# Patient Record
Sex: MALE | Race: WHITE | NOT HISPANIC OR LATINO | URBAN - METROPOLITAN AREA
[De-identification: names, ages, dates, MRNs, and addresses within clinical notes are randomized per-mention and may not be internally consistent; named-entity substitution may affect disease eponyms.]

---

## 2018-04-22 ENCOUNTER — INPATIENT (INPATIENT)
Facility: HOSPITAL | Age: 58
LOS: 1 days | Discharge: HOME | End: 2018-04-24
Attending: FAMILY MEDICINE | Admitting: FAMILY MEDICINE
Payer: COMMERCIAL

## 2018-04-22 VITALS — HEIGHT: 70 IN | WEIGHT: 182.98 LBS

## 2018-04-22 DIAGNOSIS — I82.412 ACUTE EMBOLISM AND THROMBOSIS OF LEFT FEMORAL VEIN: ICD-10-CM

## 2018-04-22 DIAGNOSIS — F17.200 NICOTINE DEPENDENCE, UNSPECIFIED, UNCOMPLICATED: ICD-10-CM

## 2018-04-22 DIAGNOSIS — D58.2 OTHER HEMOGLOBINOPATHIES: ICD-10-CM

## 2018-04-22 DIAGNOSIS — Z90.49 ACQUIRED ABSENCE OF OTHER SPECIFIED PARTS OF DIGESTIVE TRACT: Chronic | ICD-10-CM

## 2018-04-22 DIAGNOSIS — I26.99 OTHER PULMONARY EMBOLISM WITHOUT ACUTE COR PULMONALE: ICD-10-CM

## 2018-04-22 DIAGNOSIS — D72.829 ELEVATED WHITE BLOOD CELL COUNT, UNSPECIFIED: ICD-10-CM

## 2018-04-22 LAB
ADD ON TEST-SPECIMEN IN LAB: SIGNIFICANT CHANGE UP
ALBUMIN SERPL ELPH-MCNC: 3.1 G/DL — LOW (ref 3.3–5)
ALP SERPL-CCNC: 89 U/L — SIGNIFICANT CHANGE UP (ref 40–120)
ALT FLD-CCNC: 17 U/L — SIGNIFICANT CHANGE UP (ref 12–78)
ANION GAP SERPL CALC-SCNC: 8 MMOL/L — SIGNIFICANT CHANGE UP (ref 5–17)
APTT BLD: 25.8 SEC — LOW (ref 27.5–37.4)
APTT BLD: 79.7 SEC — HIGH (ref 27.5–37.4)
AST SERPL-CCNC: 13 U/L — LOW (ref 15–37)
BASOPHILS # BLD AUTO: 0.06 K/UL — SIGNIFICANT CHANGE UP (ref 0–0.2)
BASOPHILS NFR BLD AUTO: 0.5 % — SIGNIFICANT CHANGE UP (ref 0–2)
BILIRUB SERPL-MCNC: 0.5 MG/DL — SIGNIFICANT CHANGE UP (ref 0.2–1.2)
BUN SERPL-MCNC: 14 MG/DL — SIGNIFICANT CHANGE UP (ref 7–23)
CALCIUM SERPL-MCNC: 9.2 MG/DL — SIGNIFICANT CHANGE UP (ref 8.5–10.1)
CHLORIDE SERPL-SCNC: 110 MMOL/L — HIGH (ref 96–108)
CK SERPL-CCNC: 58 U/L — SIGNIFICANT CHANGE UP (ref 26–308)
CO2 SERPL-SCNC: 23 MMOL/L — SIGNIFICANT CHANGE UP (ref 22–31)
CREAT SERPL-MCNC: 0.97 MG/DL — SIGNIFICANT CHANGE UP (ref 0.5–1.3)
EOSINOPHIL # BLD AUTO: 0.2 K/UL — SIGNIFICANT CHANGE UP (ref 0–0.5)
EOSINOPHIL NFR BLD AUTO: 1.7 % — SIGNIFICANT CHANGE UP (ref 0–6)
GLUCOSE SERPL-MCNC: 101 MG/DL — HIGH (ref 70–99)
HCT VFR BLD CALC: 50.9 % — HIGH (ref 39–50)
HGB BLD-MCNC: 17.2 G/DL — HIGH (ref 13–17)
IMM GRANULOCYTES NFR BLD AUTO: 0.3 % — SIGNIFICANT CHANGE UP (ref 0–1.5)
INR BLD: 1.07 RATIO — SIGNIFICANT CHANGE UP (ref 0.88–1.16)
LYMPHOCYTES # BLD AUTO: 1.58 K/UL — SIGNIFICANT CHANGE UP (ref 1–3.3)
LYMPHOCYTES # BLD AUTO: 13.3 % — SIGNIFICANT CHANGE UP (ref 13–44)
MCHC RBC-ENTMCNC: 32 PG — SIGNIFICANT CHANGE UP (ref 27–34)
MCHC RBC-ENTMCNC: 33.8 GM/DL — SIGNIFICANT CHANGE UP (ref 32–36)
MCV RBC AUTO: 94.6 FL — SIGNIFICANT CHANGE UP (ref 80–100)
MONOCYTES # BLD AUTO: 1.09 K/UL — HIGH (ref 0–0.9)
MONOCYTES NFR BLD AUTO: 9.1 % — SIGNIFICANT CHANGE UP (ref 2–14)
NEUTROPHILS # BLD AUTO: 8.95 K/UL — HIGH (ref 1.8–7.4)
NEUTROPHILS NFR BLD AUTO: 75.1 % — SIGNIFICANT CHANGE UP (ref 43–77)
NRBC # BLD: 0 /100 WBCS — SIGNIFICANT CHANGE UP (ref 0–0)
NT-PROBNP SERPL-SCNC: 25 PG/ML — SIGNIFICANT CHANGE UP (ref 0–125)
PLATELET # BLD AUTO: 202 K/UL — SIGNIFICANT CHANGE UP (ref 150–400)
POTASSIUM SERPL-MCNC: 4.2 MMOL/L — SIGNIFICANT CHANGE UP (ref 3.5–5.3)
POTASSIUM SERPL-SCNC: 4.2 MMOL/L — SIGNIFICANT CHANGE UP (ref 3.5–5.3)
PROT SERPL-MCNC: 7.4 GM/DL — SIGNIFICANT CHANGE UP (ref 6–8.3)
PROTHROM AB SERPL-ACNC: 11.6 SEC — SIGNIFICANT CHANGE UP (ref 9.8–12.7)
RBC # BLD: 5.38 M/UL — SIGNIFICANT CHANGE UP (ref 4.2–5.8)
RBC # FLD: 13.5 % — SIGNIFICANT CHANGE UP (ref 10.3–14.5)
SODIUM SERPL-SCNC: 141 MMOL/L — SIGNIFICANT CHANGE UP (ref 135–145)
TROPONIN I SERPL-MCNC: <0.015 NG/ML — SIGNIFICANT CHANGE UP (ref 0.01–0.04)
WBC # BLD: 11.92 K/UL — HIGH (ref 3.8–10.5)
WBC # FLD AUTO: 11.92 K/UL — HIGH (ref 3.8–10.5)

## 2018-04-22 PROCEDURE — 99285 EMERGENCY DEPT VISIT HI MDM: CPT

## 2018-04-22 PROCEDURE — 71275 CT ANGIOGRAPHY CHEST: CPT | Mod: 26

## 2018-04-22 PROCEDURE — 93010 ELECTROCARDIOGRAM REPORT: CPT

## 2018-04-22 RX ORDER — ENOXAPARIN SODIUM 100 MG/ML
80 INJECTION SUBCUTANEOUS ONCE
Qty: 0 | Refills: 0 | Status: DISCONTINUED | OUTPATIENT
Start: 2018-04-22 | End: 2018-04-22

## 2018-04-22 RX ORDER — SODIUM CHLORIDE 9 MG/ML
1000 INJECTION INTRAMUSCULAR; INTRAVENOUS; SUBCUTANEOUS ONCE
Qty: 0 | Refills: 0 | Status: COMPLETED | OUTPATIENT
Start: 2018-04-22 | End: 2018-04-22

## 2018-04-22 RX ORDER — HEPARIN SODIUM 5000 [USP'U]/ML
3000 INJECTION INTRAVENOUS; SUBCUTANEOUS EVERY 6 HOURS
Qty: 0 | Refills: 0 | Status: DISCONTINUED | OUTPATIENT
Start: 2018-04-22 | End: 2018-04-24

## 2018-04-22 RX ORDER — HEPARIN SODIUM 5000 [USP'U]/ML
6500 INJECTION INTRAVENOUS; SUBCUTANEOUS EVERY 6 HOURS
Qty: 0 | Refills: 0 | Status: DISCONTINUED | OUTPATIENT
Start: 2018-04-22 | End: 2018-04-24

## 2018-04-22 RX ORDER — HEPARIN SODIUM 5000 [USP'U]/ML
6500 INJECTION INTRAVENOUS; SUBCUTANEOUS ONCE
Qty: 0 | Refills: 0 | Status: COMPLETED | OUTPATIENT
Start: 2018-04-22 | End: 2018-04-22

## 2018-04-22 RX ORDER — RIVAROXABAN 15 MG-20MG
15 KIT ORAL ONCE
Qty: 0 | Refills: 0 | Status: DISCONTINUED | OUTPATIENT
Start: 2018-04-22 | End: 2018-04-22

## 2018-04-22 RX ORDER — ACETAMINOPHEN 500 MG
650 TABLET ORAL EVERY 6 HOURS
Qty: 0 | Refills: 0 | Status: DISCONTINUED | OUTPATIENT
Start: 2018-04-22 | End: 2018-04-24

## 2018-04-22 RX ORDER — HEPARIN SODIUM 5000 [USP'U]/ML
INJECTION INTRAVENOUS; SUBCUTANEOUS
Qty: 25000 | Refills: 0 | Status: DISCONTINUED | OUTPATIENT
Start: 2018-04-22 | End: 2018-04-24

## 2018-04-22 RX ADMIN — HEPARIN SODIUM 6500 UNIT(S): 5000 INJECTION INTRAVENOUS; SUBCUTANEOUS at 16:04

## 2018-04-22 RX ADMIN — HEPARIN SODIUM 1500 UNIT(S)/HR: 5000 INJECTION INTRAVENOUS; SUBCUTANEOUS at 22:36

## 2018-04-22 RX ADMIN — SODIUM CHLORIDE 1000 MILLILITER(S): 9 INJECTION INTRAMUSCULAR; INTRAVENOUS; SUBCUTANEOUS at 14:48

## 2018-04-22 RX ADMIN — HEPARIN SODIUM 1500 UNIT(S)/HR: 5000 INJECTION INTRAVENOUS; SUBCUTANEOUS at 16:07

## 2018-04-22 NOTE — ED ADULT NURSE NOTE - OBJECTIVE STATEMENT
Pt alert and oriented x3. Pt presents with LLE pain and swelling since Wednesday. Pt seen outpatient at [facility] and had US done and results showed DVT in LLE. Pt just had a recent flight home from [place].

## 2018-04-22 NOTE — ED STATDOCS - ATTENDING CONTRIBUTION TO CARE
I, Fernando Steel DO,  performed the initial face to face bedside interview with this patient regarding history of present illness, review of symptoms and relevant past medical, social and family history.  I completed an independent physical examination.  I was the initial provider who evaluated this patient. I have signed out the follow up of any pending tests (i.e. labs, radiological studies) to the ACP.  I have communicated the patient’s plan of care and disposition with the ACP.

## 2018-04-22 NOTE — ED ADULT NURSE REASSESSMENT NOTE - NS ED NURSE REASSESS COMMENT FT1
Care transferred from Terrie Cao RN in  and pt moved to [facility] ED Room 9. Pt is A&Ox3 with wife at bedside. Pt Heparin drip started as ordered. VSS

## 2018-04-22 NOTE — H&P ADULT - ASSESSMENT
56 y/o m with PMHx of  Smoking and s/p appendectomy presenting to the ED c/o LLE pain and swelling x4 days. Pt seen as outpatient at [facility] and had US done, results showed DVT in LLE. Being admitted for left leg DVT and bilateral PE.

## 2018-04-22 NOTE — H&P ADULT - HISTORY OF PRESENT ILLNESS
56 y/o m with PMHx of  Smoking and s/p appendectomy presenting to the ED c/o LLE pain and swelling x4 days. Pt seen as outpatient at [facility] and had US done, results showed DVT in LLE. Denies fever, chills, numbness, CP, SOB. Recent flight home from [place]. Sono report shows DVT from proximal left femoral to peroneal tibial veins. Pt works as , spends a lot of time sitting. Pt states he easily bruises.

## 2018-04-22 NOTE — H&P ADULT - ATTENDING COMMENTS
Diet: Regular.    RADIOLOGY & ADDITIONAL TESTS:    Imaging Personally Reviewed:  [x ] YES  [ ] NO    Consultant(s) Notes Reviewed:  [ ] YES  [ ] NO      DVT Prophylaxis:  IV Heparin [ x ]     LMWH [ ]     Coumadin [ ]    Xaeralto [ ]    Eliquis [ ]   Venodyne pumps [ ]    Discussed with Patient [ x]     Family [x ]          [ ]   RN[x ]      [ ]    Advance Directives:  Full code.     Care Discussed with Consultants/Other Providers [x ] YES  [ ] NO    Care plan was explained to patient and wife in agreement. Questions answered. Education provided against smoking and getting establish with PCP for regular care and screening.

## 2018-04-22 NOTE — ED STATDOCS - MEDICAL DECISION MAKING DETAILS
LLE extensive DVT, plan for labs and vascular consult. LLE extensive DVT, plan for labs and vascular consult.  CTA Chest performed, +b/l multiple PE's, plan for admission, heparin.  Discussed with patient and family, understand and agree with plan.

## 2018-04-22 NOTE — H&P ADULT - NSHPPHYSICALEXAM_GEN_ALL_CORE
T(C): 37.1 (04-22-18 @ 12:36), Max: 37.1 (04-22-18 @ 12:36)  HR: 81 (04-22-18 @ 16:08) (81 - 91)  BP: 157/91 (04-22-18 @ 16:08) (157/91 - 172/98)  RR: 16 (04-22-18 @ 16:08) (16 - 19)  SpO2: 97% (04-22-18 @ 16:08) (97% - 98%)  Wt(kg): --  I&O's Summary      PHYSICAL EXAM:  GENERAL: NAD, well-groomed, well-developed  HEAD:  Atraumatic, Normocephalic  EYES: EOMI, PERRLA, conjunctiva and sclera clear  ENMT: No tonsillar erythema, exudates, or enlargement; Moist mucous membranes. No lesions.  NECK: Supple, No JVD, Normal thyroid  NERVOUS SYSTEM:  Alert & Oriented X3, Good concentration; Motor Strength 5/5 B/L upper and lower extremities.  CHEST/LUNG: Clear to percussion bilaterally; No rales, rhonchi, wheezing, or rubs  HEART: Regular rate and rhythm; No murmurs, rubs, or gallops  ABDOMEN: Soft, Nontender, Nondistended; Bowel sounds present  EXTREMITIES:  2+ Peripheral Pulses, No clubbing, cyanosis, or edema  LYMPH: No lymphadenopathy noted  SKIN: No rashes or lesions T(C): 37.1 (04-22-18 @ 12:36), Max: 37.1 (04-22-18 @ 12:36)  HR: 81 (04-22-18 @ 16:08) (81 - 91)  BP: 157/91 (04-22-18 @ 16:08) (157/91 - 172/98)  RR: 16 (04-22-18 @ 16:08) (16 - 19)  SpO2: 97% (04-22-18 @ 16:08) (97% - 98%)  Wt(kg): --  I&O's Summary      PHYSICAL EXAM:  GENERAL: NAD, well-groomed, well-developed  HEAD:  Atraumatic, Normocephalic  EYES: EOMI, PERRLA, conjunctiva and sclera clear  ENMT: No tonsillar erythema, exudates, or enlargement; Moist mucous membranes. No lesions.  NECK: Supple, No JVD, Normal thyroid  NERVOUS SYSTEM:  Alert & Oriented X3, Good concentration; Motor Strength 5/5 B/L upper and lower extremities.  CHEST/LUNG: Clear to percussion bilaterally; No rales, rhonchi, wheezing, or rubs  HEART: Regular rate and rhythm; No murmurs, rubs, or gallops  ABDOMEN: Soft, Nontender, Nondistended; Bowel sounds present  EXTREMITIES:  2+ Peripheral Pulses, No clubbing, cyanosis, or edema. No left calve tenderness but some swelling of left thigh.  LYMPH: No lymphadenopathy noted  SKIN: No rashes or lesions

## 2018-04-22 NOTE — ED STATDOCS - OBJECTIVE STATEMENT
56 y/o m with PMHx of s/p appendectomy presenting to the ED c/o LLE pain and swelling x4 days. Pt seen as outpatient at [facility] and had US done, results showed DVT in LLE. Denies fever, chills, numbness, CP, SOB. Recent flight home from [place]. Sono report shows DVT from proximal left femoral to peroneal tibial veins. Pt works as , spends a lot of time sitting. Pt states he easily bruises. Current smoker.

## 2018-04-22 NOTE — ED STATDOCS - SKIN, MLM
skin normal color for race, warm, dry and intact. Significant swelling LLE vs RLE. 6 cm circumference difference from LLE to RLE. LLE warm to touch, mild erythema to anterior shin, mild prominent veins to foot and ankle. 2+ edema LLE.

## 2018-04-22 NOTE — ED STATDOCS - PHYSICAL EXAMINATION
GEN: AOX3, NAD. HEENT: Throat clear. Head NC/AT. NECK: Supple, No JVD. FROM. C-spine non-tender. CV:S1S2, RRR, LUNGS: CTA b/l, no w/r/r. ABD: Soft, NT/ND, no rebound, no guarding. No CVAT. EXT: No e/c/c. 2+ distal pulses. LLE: +Mild diffuse swelling left calf. +Tenderness left calf area. NVI. 2+ distal pulses. SKIN: No rashes. NEURO: No focal deficits. CN II-XII intact. FROM. 5/5 motor and sensory. YAMILEX Rubin

## 2018-04-22 NOTE — H&P ADULT - PROBLEM SELECTOR PLAN 1
Hemodynamically stable. Check troponin and BNP.  IV Heparin till converted to oral agents. Hematology and vascular surgery consult. Telemetry monitoring for 24 hours. Echo. Possibly due to travel with underlying suspected polycythemia.

## 2018-04-22 NOTE — H&P ADULT - PROBLEM SELECTOR PLAN 2
Hemodynamically stable. Check troponin and BNP.  IV Heparin till converted to oral agents. Hematology and vascular surgery consult. Possibly due to travel with underlying suspected polycythemia.

## 2018-04-22 NOTE — ED STATDOCS - NS_ ATTENDINGSCRIBEDETAILS _ED_A_ED_FT
Fernando Steel DO (Attending): The history, relevant review of systems, past medical and surgical history, medical decision making, and physical examination was documented by the scribe in my presence and I attest to the accuracy of the documentation.

## 2018-04-23 DIAGNOSIS — I26.99 OTHER PULMONARY EMBOLISM WITHOUT ACUTE COR PULMONALE: ICD-10-CM

## 2018-04-23 LAB
APTT BLD: 47.4 SEC — HIGH (ref 27.5–37.4)
APTT BLD: 70.4 SEC — HIGH (ref 27.5–37.4)
APTT BLD: 88.5 SEC — HIGH (ref 27.5–37.4)
CHOLEST SERPL-MCNC: 191 MG/DL — SIGNIFICANT CHANGE UP (ref 10–199)
FERRITIN SERPL-MCNC: 385 NG/ML — SIGNIFICANT CHANGE UP (ref 30–400)
HCT VFR BLD CALC: 46.1 % — SIGNIFICANT CHANGE UP (ref 39–50)
HDLC SERPL-MCNC: 45 MG/DL — SIGNIFICANT CHANGE UP (ref 40–125)
HGB BLD-MCNC: 15.6 G/DL — SIGNIFICANT CHANGE UP (ref 13–17)
INR BLD: 1.14 RATIO — SIGNIFICANT CHANGE UP (ref 0.88–1.16)
IRON SATN MFR SERPL: 14 % — LOW (ref 16–55)
IRON SATN MFR SERPL: 27 UG/DL — LOW (ref 45–165)
LIPID PNL WITH DIRECT LDL SERPL: 127 MG/DL — SIGNIFICANT CHANGE UP
MCHC RBC-ENTMCNC: 32 PG — SIGNIFICANT CHANGE UP (ref 27–34)
MCHC RBC-ENTMCNC: 33.8 GM/DL — SIGNIFICANT CHANGE UP (ref 32–36)
MCV RBC AUTO: 94.7 FL — SIGNIFICANT CHANGE UP (ref 80–100)
NRBC # BLD: 0 /100 WBCS — SIGNIFICANT CHANGE UP (ref 0–0)
PLATELET # BLD AUTO: 191 K/UL — SIGNIFICANT CHANGE UP (ref 150–400)
PROTHROM AB SERPL-ACNC: 12.3 SEC — SIGNIFICANT CHANGE UP (ref 9.8–12.7)
RBC # BLD: 4.87 M/UL — SIGNIFICANT CHANGE UP (ref 4.2–5.8)
RBC # FLD: 13.6 % — SIGNIFICANT CHANGE UP (ref 10.3–14.5)
TIBC SERPL-MCNC: 187 UG/DL — LOW (ref 220–430)
TOTAL CHOLESTEROL/HDL RATIO MEASUREMENT: 4.2 RATIO — SIGNIFICANT CHANGE UP (ref 3.4–9.6)
TRIGL SERPL-MCNC: 96 MG/DL — SIGNIFICANT CHANGE UP (ref 10–149)
TROPONIN I SERPL-MCNC: <0.015 NG/ML — SIGNIFICANT CHANGE UP (ref 0.01–0.04)
UIBC SERPL-MCNC: 160 UG/DL — SIGNIFICANT CHANGE UP (ref 110–370)
WBC # BLD: 11.74 K/UL — HIGH (ref 3.8–10.5)
WBC # FLD AUTO: 11.74 K/UL — HIGH (ref 3.8–10.5)

## 2018-04-23 PROCEDURE — 93010 ELECTROCARDIOGRAM REPORT: CPT

## 2018-04-23 PROCEDURE — 93306 TTE W/DOPPLER COMPLETE: CPT | Mod: 26

## 2018-04-23 PROCEDURE — 99253 IP/OBS CNSLTJ NEW/EST LOW 45: CPT

## 2018-04-23 PROCEDURE — 99223 1ST HOSP IP/OBS HIGH 75: CPT

## 2018-04-23 RX ORDER — TRAMADOL HYDROCHLORIDE 50 MG/1
50 TABLET ORAL EVERY 8 HOURS
Qty: 0 | Refills: 0 | Status: DISCONTINUED | OUTPATIENT
Start: 2018-04-23 | End: 2018-04-24

## 2018-04-23 RX ADMIN — HEPARIN SODIUM 1700 UNIT(S)/HR: 5000 INJECTION INTRAVENOUS; SUBCUTANEOUS at 21:34

## 2018-04-23 RX ADMIN — HEPARIN SODIUM 3000 UNIT(S): 5000 INJECTION INTRAVENOUS; SUBCUTANEOUS at 06:15

## 2018-04-23 RX ADMIN — HEPARIN SODIUM 1700 UNIT(S)/HR: 5000 INJECTION INTRAVENOUS; SUBCUTANEOUS at 06:12

## 2018-04-23 RX ADMIN — HEPARIN SODIUM 1700 UNIT(S)/HR: 5000 INJECTION INTRAVENOUS; SUBCUTANEOUS at 12:45

## 2018-04-23 NOTE — CONSULT NOTE ADULT - SUBJECTIVE AND OBJECTIVE BOX
REASON FOR CONSULT:  bilateral PE     CHIEF COMPLAINT: LLE swelling with pain for 4 days     HPI:  58 y/o m with PMHx of  Smoking and s/p appendectomy presenting to the ED c/o LLE pain and swelling x4 days which developed after prolonged air travel , started noticing pain calf , swelling of left leg , was able to manage for few days , took another long flight ,  went to  out patient  imaging center , noted to have LLE venous doppler ,  came to ER had CT angio of chest showing bilateral multiple PE , patient denies any chest pain or shortness of breath or palpitation at rest or exertion ,   Patient was started on IV heparin protocol . Patient hemodynamically stable     Patient did  not see any physician  for routine health check up .       PAST MEDICAL & SURGICAL HISTORY:  No pertinent past medical history  History of appendectomy      Allergies    No Known Allergies    Intolerances        SOCIAL HISTORY: active smoker , occasional alcohol     FAMILY HISTORY:  Family history of hypertension in mother (Mother)  no family hx of hypercoagulable state       MEDICATIONS:  MEDICATIONS  (STANDING):  heparin  Infusion.  Unit(s)/Hr (15 mL/Hr) IV Continuous <Continuous>    MEDICATIONS  (PRN):  acetaminophen   Tablet. 650 milliGRAM(s) Oral every 6 hours PRN Mild Pain (1 - 3)  heparin  Injectable 6500 Unit(s) IV Push every 6 hours PRN For aPTT less than 40  heparin  Injectable 3000 Unit(s) IV Push every 6 hours PRN For aPTT between 40 - 57      REVIEW OF SYSTEMS:    CONSTITUTIONAL: No weakness, fevers or chills  EYES/ENT: No visual changes;  No vertigo or throat pain   NECK: No pain or stiffness  RESPIRATORY: No cough, wheezing, hemoptysis; No shortness of breath  CARDIOVASCULAR: No chest pain or palpitations  left leg pain , will swelling   GASTROINTESTINAL: No abdominal or epigastric pain. No nausea, vomiting, or hematemesis; No diarrhea or constipation. No melena or hematochezia.  GENITOURINARY: No dysuria, frequency or hematuria  NEUROLOGICAL: No numbness or weakness  SKIN: No itching, burning, rashes, or lesions   All other review of systems is negative unless indicated above    Vital Signs Last 24 Hrs  T(C): 37.1 (2018 04:37), Max: 37.1 (2018 12:36)  T(F): 98.8 (2018 04:37), Max: 98.8 (2018 04:37)  HR: 85 (2018 04:37) (78 - 91)  BP: 128/70 (2018 04:37) (124/88 - 172/98)  BP(mean): --  RR: 16 (2018 04:37) (16 - 19)  SpO2: 94% (2018 04:37) (94% - 98%)    I&O's Summary      PHYSICAL EXAM:    Constitutional: NAD, awake and alert, well-developed  HEENT: PERR, EOMI,  No oral cyananosis.  Neck:  supple,  No JVD  Respiratory: Breath sounds are clear bilaterally, No wheezing, rales or rhonchi  Cardiovascular: S1 and S2, regular rate and rhythm, no Murmurs, gallops or rubs  Gastrointestinal: Bowel Sounds present, soft, nontender.   Extremities: No peripheral edema. No clubbing or cyanosis.  Vascular: 2+ peripheral pulses  Neurological: A/O x 3, no focal deficits  Musculoskeletal: no calf tenderness.  Skin: No rashes.      LABS: All Labs Reviewed:                        15.6   11.74 )-----------( 191      ( 2018 04:22 )             46.1                         17.2   11.92 )-----------( 202      ( 2018 13:31 )             50.9     2018 13:31    141    |  110    |  14     ----------------------------<  101    4.2     |  23     |  0.97     Ca    9.2        2018 13:31    TPro  7.4    /  Alb  3.1    /  TBili  0.5    /  DBili  x      /  AST  13     /  ALT  17     /  AlkPhos  89     2018 13:31    PT/INR - ( 2018 04:21 )   PT: 11.8 sec;   INR: 1.09 ratio         PTT - ( 2018 04:21 )  PTT:47.4 sec  CARDIAC MARKERS ( 2018 13:31 )  <0.015 ng/mL / x     / 58 U/L / x     / x          Blood Culture:    @ 13:31  Pro Bnp 25        RADIOLOGY/EK18  normal sinus rhythm 87 BPM     Monitor sinus rhythm

## 2018-04-23 NOTE — CONSULT NOTE ADULT - PROBLEM SELECTOR RECOMMENDATION 9
Bilateral PE ,  hemodynamically stable , normal EKG , pro BNP , troponin ,   would repeat EKG , obtain echocardiogram  , work up for hypercoagulable state , hematology evaluation as patient has mild erythrocytosis ,     continue IV heparin , high range protocol ,

## 2018-04-23 NOTE — CONSULT NOTE ADULT - SUBJECTIVE AND OBJECTIVE BOX
56 y/o m with PMHx of  Smoking  for 45 years 2/3 of a pack. S/P  appendectomy presenting to the ED c/o LLE pain and swelling x4 days which developed after prolonged air travel. Patient  went to [place]  16 hr flight going and 10 hours returning to [place]. Patient started noticing pain calf , swelling of left leg . In the ER, CT angio of chest showing bilateral multiple PE , patient denies any chest pain or shortness of breath or palpitation at rest or exertion ,   Patient was started on IV heparin protocol . Patient hemodynamically stable.     PAST MEDICAL & SURGICAL HISTORY:  No pertinent past medical history  History of appendectomy  Allergies    No Known Allergies    Intolerances    SOCIAL HISTORY: active smoker , occasional alcohol     FAMILY HISTORY:  Family history of hypertension in mother (Mother)  no family hx of hypercoagulable state       ICU Vital Signs Last 24 Hrs  T(C): 37.1 (23 Apr 2018 09:39), Max: 37.1 (22 Apr 2018 12:36)  T(F): 98.7 (23 Apr 2018 09:39), Max: 98.8 (23 Apr 2018 04:37)  HR: 82 (23 Apr 2018 09:39) (78 - 91)  BP: 135/79 (23 Apr 2018 09:39) (124/88 - 172/98)  BP(mean): --  ABP: --  ABP(mean): --  RR: 18 (23 Apr 2018 09:39) (16 - 19)  SpO2: 90% (23 Apr 2018 09:39) (90% - 98%)    General gentleman in NAD  HEENT  Lungs right lower lobe rales, no crackles or 56 y/o m with PMHx of  Smoking  for 45 years 2/3 of a pack. S/P  appendectomy presenting to the ED c/o LLE pain and swelling x4 days which developed after prolonged air travel. Patient  went to [place]  16 hr flight going and 10 hours returning to [place]. Patient started noticing pain calf , swelling of left leg . In the ER, CT angio of chest showing bilateral multiple PE , patient denies any chest pain or shortness of breath or palpitation at rest or exertion ,   Patient was started on IV heparin protocol . Patient hemodynamically stable.     PAST MEDICAL & SURGICAL HISTORY:  No pertinent past medical history  History of appendectomy  Allergies    No Known Allergies    Intolerances    SOCIAL HISTORY: active smoker , occasional alcohol     FAMILY HISTORY:  Family history of hypertension in mother (Mother)  no family hx of hypercoagulable state       ICU Vital Signs Last 24 Hrs  T(C): 37.1 (23 Apr 2018 09:39), Max: 37.1 (22 Apr 2018 12:36)  T(F): 98.7 (23 Apr 2018 09:39), Max: 98.8 (23 Apr 2018 04:37)  HR: 82 (23 Apr 2018 09:39) (78 - 91)  BP: 135/79 (23 Apr 2018 09:39) (124/88 - 172/98)  BP(mean): --  ABP: --  ABP(mean): --  RR: 18 (23 Apr 2018 09:39) (16 - 19)  SpO2: 90% (23 Apr 2018 09:39) (90% - 98%)    General gentleman in NAD  HEENT  Lungs right lower lobe rales, no crackles or wheezing  CVS S1 S2 regular, no M/R/G  Abdomen soft Nt ND positive for BS, no organomegaly  extremities; Positive for left lower extremity swelling.  Neurology; No focal deficit.    MEDICATIONS  (STANDING):  heparin  Infusion.  Unit(s)/Hr (15 mL/Hr) IV Continuous <Continuous>    CT angio 4/22/18    FINDINGS:    LOWER NECK: Within normal limits.  AXILLA, MEDIASTINUM AND CONRAD: No lymphadenopathy.  VESSELS: Atherosclerotic arterial calcifications, including the coronary   arteries.  Normal caliber aorta. Adequate pulmonary arterial   opacification. Multiple emboli identified in segmental and subsegmental   vessels of the right upper, middle and lower lobes, and in the left upper   lobe. No evidence of right heart strain.  HEART: Heart size is normal.No pericardial effusion.  PLEURA: No pleural effusion.  LUNGS AND LARGE AIRWAYS: No pulmonary nodule, mass or consolidation.   Patent central airways.   VISUALIZED UPPER ABDOMEN: Within normal limits.  BONES: No acute abnormality.  CHEST WALL:  Unremarkable    IMPRESSION: Multiple segmental and subsegmental pulmonary emboli.

## 2018-04-23 NOTE — CONSULT NOTE ADULT - PROBLEM SELECTOR RECOMMENDATION 2
possibly due to prolong air travel .     with LLE swelling , pain , improving clinically , continue IV heparin ,  encourage to quit smoking ,     would obtain lipid profile

## 2018-04-23 NOTE — PROGRESS NOTE ADULT - SUBJECTIVE AND OBJECTIVE BOX
CC: pain and swelling of left leg (22 Apr 2018 23:01)    HPI:  56 y/o m with PMHx of  Smoking and s/p appendectomy presenting to the ED c/o LLE pain and swelling x4 days. Pt seen as outpatient at [facility] and had US done, results showed DVT in LLE. Denies fever, chills, numbness, CP, SOB. Recent flight home from [place]. Sono report shows DVT from proximal left femoral to peroneal tibial veins. Pt works as , spends a lot of time sitting. Pt states he easily bruises. (22 Apr 2018 16:45)    INTERVAL HPI/ OVERNIGHT EVENTS: Pt was seen and examined, above history confirmed by Pt .  C/o pain  LLE, better now. No SOB, no CP. POC discussed     Vital Signs Last 24 Hrs  T(C): 37.1 (23 Apr 2018 14:16), Max: 37.1 (23 Apr 2018 04:37)  T(F): 98.8 (23 Apr 2018 14:16), Max: 98.8 (23 Apr 2018 04:37)  HR: 85 (23 Apr 2018 14:16) (82 - 85)  BP: 137/83 (23 Apr 2018 14:16) (124/88 - 137/83)  RR: 16 (23 Apr 2018 14:16) (16 - 18)  SpO2: 93% (23 Apr 2018 14:16) (90% - 95%)      REVIEW OF SYSTEMS:  All other review of systems is negative unless indicated above.      PHYSICAL EXAM:  General: Well developed;  in no acute distress  Eyes: PERRLA, EOMI; conjunctiva and sclera clear  Head: Normocephalic; atraumatic  ENMT: No nasal discharge; airway clear  Neck: Supple; non tender; no masses  Respiratory: Decreased BS,  No wheezes, rales or rhonchi  Cardiovascular: Regular rate and rhythm. S1 and S2 Normal; No murmurs  Gastrointestinal: Soft non-tender non-distended; Normal bowel sounds  Genitourinary: No costovertebral angle tenderness  Extremities: Normal range of motion, LLE +1 edema, L calf tenderness to palpation   Vascular: Peripheral pulses palpable 2+ bilaterally  Neurological: Alert and oriented x4  Skin: Warm and dry. No acute rash  Lymph Nodes: No acute cervical adenopathy  Musculoskeletal: Normal muscle  tone, without deformities  Psychiatric: Cooperative and appropriate      LABS:   CARDIAC MARKERS ( 23 Apr 2018 09:44 )  <0.015 ng/mL / x     / x     / x     / x      CARDIAC MARKERS ( 22 Apr 2018 13:31 )  <0.015 ng/mL / x     / 58 U/L / x     / x                                15.6   11.74 )-----------( 191      ( 23 Apr 2018 04:22 )             46.1     22 Apr 2018 13:31    141    |  110    |  14     ----------------------------<  101    4.2     |  23     |  0.97     Ca    9.2        22 Apr 2018 13:31    TPro  7.4    /  Alb  3.1    /  TBili  0.5    /  DBili  x      /  AST  13     /  ALT  17     /  AlkPhos  89     22 Apr 2018 13:31    PT/INR - ( 23 Apr 2018 09:23 )   PT: 12.3 sec;   INR: 1.14 ratio       PTT - ( 23 Apr 2018 18:57 )  PTT:70.4 sec  LIVER FUNCTIONS - ( 22 Apr 2018 13:31 )  Alb: 3.1 g/dL / Pro: 7.4 gm/dL / ALK PHOS: 89 U/L / ALT: 17 U/L / AST: 13 U/L / GGT: x               MEDICATIONS  (STANDING):  heparin  Infusion.  Unit(s)/Hr (15 mL/Hr) IV Continuous <Continuous>    MEDICATIONS  (PRN):  acetaminophen   Tablet. 650 milliGRAM(s) Oral every 6 hours PRN Mild Pain (1 - 3)  heparin  Injectable 6500 Unit(s) IV Push every 6 hours PRN For aPTT less than 40  heparin  Injectable 3000 Unit(s) IV Push every 6 hours PRN For aPTT between 40 - 57      RADIOLOGY & ADDITIONAL TESTS:    EXAM:  CT ANGIO CHEST PE PROTOCOL IC                        PROCEDURE DATE:  04/22/2018      FINDINGS:    LOWER NECK: Within normal limits.  AXILLA, MEDIASTINUM AND CONRAD: No lymphadenopathy.  VESSELS: Atherosclerotic arterial calcifications, including the coronary   arteries.  Normal caliber aorta. Adequate pulmonary arterial   opacification. Multiple emboli identified in segmental and subsegmental   vessels of the right upper, middle and lower lobes, and in the left upper   lobe. No evidence of right heart strain.  HEART: Heart size is normal.No pericardial effusion.  PLEURA: No pleural effusion.  LUNGS AND LARGE AIRWAYS: No pulmonary nodule, mass or consolidation.   Patent central airways.   VISUALIZED UPPER ABDOMEN: Within normal limits.  BONES: No acute abnormality.  CHEST WALL:  Unremarkable    IMPRESSION: Multiple segmental and subsegmental pulmonary emboli.

## 2018-04-23 NOTE — CONSULT NOTE ADULT - ASSESSMENT
58 y/o m with PMHx of  Smoking  for 45 years 2/3 of a pack. Patient presents with DVT and multiple bilateral PEs  which developed after prolonged air travel.   Recommend hypercoagulable work up:    Antithrombin III  Prothrombin gene mutation   Factor V Leiden  lupus anticoagulant  antiphospholipid antibody 58 y/o m with PMHx of  Smoking  for 45 years 2/3 of a pack. Patient presents with DVT and multiple bilateral PEs  which developed after prolonged air travel.   Recommend hypercoagulable work up:    Antithrombin III  Prothrombin gene mutation   Factor V Leiden  lupus anticoagulant  antiphospholipid antibody    The patient has significant history of smoking, there is no data to support multiple testing to identify a malignancy, there is no convincing survival advantage in patient who present with VTE ot PE. The recommendations are to do screening, prevention  of cancer. eg., colonoscopy.    Patient could be transition to Xarelto 15 mg BIS for 21 days prior to discharge. Patient should be anticoagulated for 6 months to a year.  Recommend follow up with hematology in 10 days. [phone].    Thanks

## 2018-04-24 ENCOUNTER — TRANSCRIPTION ENCOUNTER (OUTPATIENT)
Age: 58
End: 2018-04-24

## 2018-04-24 VITALS
DIASTOLIC BLOOD PRESSURE: 78 MMHG | OXYGEN SATURATION: 95 % | SYSTOLIC BLOOD PRESSURE: 140 MMHG | RESPIRATION RATE: 18 BRPM | TEMPERATURE: 98 F | HEART RATE: 77 BPM

## 2018-04-24 LAB
ANION GAP SERPL CALC-SCNC: 8 MMOL/L — SIGNIFICANT CHANGE UP (ref 5–17)
APTT BLD: 64.5 SEC — HIGH (ref 27.5–37.4)
BUN SERPL-MCNC: 16 MG/DL — SIGNIFICANT CHANGE UP (ref 7–23)
CALCIUM SERPL-MCNC: 8.8 MG/DL — SIGNIFICANT CHANGE UP (ref 8.5–10.1)
CHLORIDE SERPL-SCNC: 106 MMOL/L — SIGNIFICANT CHANGE UP (ref 96–108)
CO2 SERPL-SCNC: 25 MMOL/L — SIGNIFICANT CHANGE UP (ref 22–31)
CREAT SERPL-MCNC: 0.88 MG/DL — SIGNIFICANT CHANGE UP (ref 0.5–1.3)
GLUCOSE SERPL-MCNC: 97 MG/DL — SIGNIFICANT CHANGE UP (ref 70–99)
HCT VFR BLD CALC: 46.5 % — SIGNIFICANT CHANGE UP (ref 39–50)
HGB BLD-MCNC: 15.7 G/DL — SIGNIFICANT CHANGE UP (ref 13–17)
MCHC RBC-ENTMCNC: 32 PG — SIGNIFICANT CHANGE UP (ref 27–34)
MCHC RBC-ENTMCNC: 33.8 GM/DL — SIGNIFICANT CHANGE UP (ref 32–36)
MCV RBC AUTO: 94.7 FL — SIGNIFICANT CHANGE UP (ref 80–100)
NRBC # BLD: 0 /100 WBCS — SIGNIFICANT CHANGE UP (ref 0–0)
PLATELET # BLD AUTO: 200 K/UL — SIGNIFICANT CHANGE UP (ref 150–400)
POTASSIUM SERPL-MCNC: 4 MMOL/L — SIGNIFICANT CHANGE UP (ref 3.5–5.3)
POTASSIUM SERPL-SCNC: 4 MMOL/L — SIGNIFICANT CHANGE UP (ref 3.5–5.3)
RBC # BLD: 4.91 M/UL — SIGNIFICANT CHANGE UP (ref 4.2–5.8)
RBC # FLD: 13.4 % — SIGNIFICANT CHANGE UP (ref 10.3–14.5)
SODIUM SERPL-SCNC: 139 MMOL/L — SIGNIFICANT CHANGE UP (ref 135–145)
WBC # BLD: 10.56 K/UL — HIGH (ref 3.8–10.5)
WBC # FLD AUTO: 10.56 K/UL — HIGH (ref 3.8–10.5)

## 2018-04-24 PROCEDURE — 99233 SBSQ HOSP IP/OBS HIGH 50: CPT

## 2018-04-24 RX ORDER — ACETAMINOPHEN 500 MG
2 TABLET ORAL
Qty: 0 | Refills: 0 | COMMUNITY
Start: 2018-04-24

## 2018-04-24 RX ORDER — RIVAROXABAN 15 MG-20MG
1 KIT ORAL
Qty: 42 | Refills: 0 | OUTPATIENT
Start: 2018-04-24 | End: 2018-05-14

## 2018-04-24 RX ORDER — RIVAROXABAN 15 MG-20MG
15 KIT ORAL
Qty: 0 | Refills: 0 | Status: DISCONTINUED | OUTPATIENT
Start: 2018-04-24 | End: 2018-04-24

## 2018-04-24 RX ORDER — RIVAROXABAN 15 MG-20MG
1 KIT ORAL
Qty: 42 | Refills: 0
Start: 2018-04-24 | End: 2018-05-14

## 2018-04-24 RX ORDER — TRAMADOL HYDROCHLORIDE 50 MG/1
1 TABLET ORAL
Qty: 21 | Refills: 0 | OUTPATIENT
Start: 2018-04-24 | End: 2018-04-30

## 2018-04-24 RX ADMIN — RIVAROXABAN 15 MILLIGRAM(S): KIT at 11:55

## 2018-04-24 RX ADMIN — TRAMADOL HYDROCHLORIDE 50 MILLIGRAM(S): 50 TABLET ORAL at 12:05

## 2018-04-24 RX ADMIN — RIVAROXABAN 15 MILLIGRAM(S): KIT at 16:37

## 2018-04-24 RX ADMIN — HEPARIN SODIUM 1700 UNIT(S)/HR: 5000 INJECTION INTRAVENOUS; SUBCUTANEOUS at 09:22

## 2018-04-24 NOTE — DISCHARGE NOTE ADULT - CARE PROVIDER_API CALL
Nick Ramírez), Cardiovascular Disease; Internal Medicine  [address] [place] [address]  Phone: [phone]  Fax: [phone]    Yancy Renee), Internal Medicine; Medical Oncology  [address] [place] [address]  Phone: [phone]  Fax: [phone]

## 2018-04-24 NOTE — PROGRESS NOTE ADULT - PROBLEM SELECTOR PLAN 1
Acute, bilateral PE; tolerating anticoagulation; I reviewed TTE: Normal RV size and function; f/u hypercoaguable work-up; transition to NOAC.
Likely provoked. Pt had prolonged immobilization  due to plane trip   Hemodynamically stable.  troponin and BNP:  neg   ECHO pending report   C/w IV Heparin, plan to switch to NOACs in am, d/w Pt   Hematology eval called

## 2018-04-24 NOTE — PROGRESS NOTE ADULT - SUBJECTIVE AND OBJECTIVE BOX
REASON FOR VISIT; Acute PE    HPI:  58 y/o man with a history of tobacco use admitted 4/22/18 with acute, bilateral PE occurring upon his return from a vacation in [place].    4/24/18:  Comfortable no SOB or pleuritic CP; ambulating; eager to be discharged.    MEDICATIONS  (STANDING):  heparin  Infusion.  Unit(s)/Hr (15 mL/Hr) IV Continuous <Continuous>    MEDICATIONS  (PRN):  acetaminophen   Tablet. 650 milliGRAM(s) Oral every 6 hours PRN Mild Pain (1 - 3)  heparin  Injectable 6500 Unit(s) IV Push every 6 hours PRN For aPTT less than 40  heparin  Injectable 3000 Unit(s) IV Push every 6 hours PRN For aPTT between 40 - 57  traMADol 50 milliGRAM(s) Oral every 8 hours PRN Moderate Pain (4 - 6)    Vital Signs Last 24 Hrs  T(C): 36.8 (24 Apr 2018 06:26), Max: 37.1 (23 Apr 2018 09:39)  T(F): 98.2 (24 Apr 2018 06:26), Max: 98.8 (23 Apr 2018 14:16)  HR: 77 (24 Apr 2018 06:26) (77 - 94)  BP: 121/81 (24 Apr 2018 06:26) (121/81 - 137/83)  RR: 16 (24 Apr 2018 06:26) (16 - 18)  SpO2: 93% (24 Apr 2018 06:26) (90% - 94%)    PHYSICAL EXAM:  Constitutional: Well-appearing, supine in bed, no distress  Neck:  supple,  No JVD  Respiratory: Breath sounds are clear bilaterally, No wheezing, rales or rhonchi  Cardiovascular: S1 and S2, regular rate and rhythm, no Murmur  Gastrointestinal: Bowel Sounds present, soft, nontender.   Skin: No rash.  Psych:  Appropriate mood and affect    LABS:   CARDIAC MARKERS ( 23 Apr 2018 09:44 ) <0.015 ng/mL / x     / x     / x     / x      CARDIAC MARKERS ( 22 Apr 2018 13:31 ) <0.015 ng/mL / x     / 58 U/L / x     / x                         15.7   10.56 )-----------( 200      ( 24 Apr 2018 06:20 )             46.5     139  |  106  |  16  ----------------------------<  97  4.0   |  25  |  0.88    12 Lead ECG (04.23.18 @ 09:49):  Normal sinus rhythm.    Tele: Sinus rhythm; no arrhythmia

## 2018-04-24 NOTE — DISCHARGE NOTE ADULT - HOSPITAL COURSE
56 y/o m with PMHx of  Smoking and s/p appendectomy presenting to the ED c/o LLE pain and swelling x4 days. Pt seen as outpatient at [facility] and had US done, results showed DVT in LLE. Denies fever, chills, numbness, CP, SOB. Recent flight home from [place]. Sono report shows DVT from proximal left femoral to peroneal tibial veins. Pt works as , spends a lot of time sitting. Pt states he easily bruises.  PHYSICAL EXAM:  General: Well developed;  in no acute distress  Eyes: PERRLA, EOMI; conjunctiva and sclera clear  Head: Normocephalic; atraumatic  ENMT: No nasal discharge; airway clear  Neck: Supple; non tender; no masses  Respiratory: Decreased BS,  No wheezes, rales or rhonchi  Cardiovascular: Regular rate and rhythm. S1 and S2 Normal; No murmurs  Gastrointestinal: Soft non-tender non-distended; Normal bowel sounds  Genitourinary: No costovertebral angle tenderness  Extremities: Normal range of motion, LLE +1 edema, L calf tenderness to palpation   Vascular: Peripheral pulses palpable 2+ bilaterally  Neurological: Alert and oriented x4  Skin: Warm and dry. No acute rash  Lymph Nodes: No acute cervical adenopathy  Musculoskeletal: Normal muscle  tone, without deformities  Psychiatric: Cooperative and appropriate      Problem/Plan - 1:  Acute LLE DVT. Acute pulmonary embolism without acute cor pulmonale.  Likely provoked. Pt had prolonged immobilization  due to plane trip   Hemodynamically stable.  troponin and BNP:  neg   ECHO pending report   C/w IV Heparin, plan to switch to NOACs in am, d/w Pt   Hematology eval called.       Control pain with Tylenol Ultram PRN  Ambulate.     Problem/Plan - 3:  ·  Problem: Smoking.  Plan: Abstinence. Nicotine replacement was  Offered  but declined.     Problem/Plan - 4:  ·  Problem: Elevated hemoglobin.  Plan: Possibly due to smoking.   improved   iron studies: no overload   Hematology to see.     Problem/Plan - 5:  ·  Problem: Leukocytosis, unspecified type.  Plan: Likely reactive  Monitor. 56 y/o m with PMHx of  Smoking and s/p appendectomy presented  to the ED c/o LLE pain and swelling x4 days. Pt seen as outpatient at [facility] and had US done, results showed  DVT from proximal left femoral to peroneal tibial veins.  Also reported recent flight home from [place].  In ED CT angio + for b/l segmental and subsegmental PE. Started on IV heparin.  Respiratory status remained stable. Tolerates RA. ECHO was done and no signs of R heart strain. Pt was evaluated by cardio and hematology. Hypergoag. work up sent. A/c switched to XArelto.  Pt will f/u with Dr Palla, has no PCP and wants to establish care. Will also f/u with Dr Renee for results of hypergoag. work up and further managing. Pt today reports LLE pain much improved,  ambulates better.  Meds and d/c planning as well as age appropriate screening for malignancy discussed   Vital Signs Last 24 Hrs  T(C): 36.9 (24 Apr 2018 09:42), Max: 37.1 (23 Apr 2018 14:16)  T(F): 98.4 (24 Apr 2018 09:42), Max: 98.8 (23 Apr 2018 14:16)  HR: 77 (24 Apr 2018 09:42) (77 - 94)  BP: 140/78 (24 Apr 2018 09:42) (121/81 - 140/78)-  RR: 18 (24 Apr 2018 09:42) (16 - 18)  SpO2: 95% (24 Apr 2018 09:42) (93% - 95%)      PHYSICAL EXAM:  General: Well developed;  in no acute distress  Eyes: PERRLA, EOMI; conjunctiva and sclera clear  Head: Normocephalic; atraumatic  ENMT: No nasal discharge; airway clear  Neck: Supple; non tender; no masses  Respiratory: Decreased BS,  No wheezes, rales or rhonchi  Cardiovascular: Regular rate and rhythm. S1 and S2 Normal; No murmurs  Gastrointestinal: Soft non-tender non-distended; Normal bowel sounds  Genitourinary: No costovertebral angle tenderness  Extremities: Normal range of motion, LLE +1 edema, L calf tenderness to palpation   Vascular: Peripheral pulses palpable 2+ bilaterally  Neurological: Alert and oriented x4  Skin: Warm and dry. No acute rash  Lymph Nodes: No acute cervical adenopathy  Musculoskeletal: Normal muscle  tone, without deformities  Psychiatric: Cooperative and appropriate    PLAN:    1. Acute LLE DVT. Acute pulmonary embolism without acute cor pulmonale.  Likely provoked. Pt had prolonged immobilization  due to plane trip   Hemodynamically  and respiratory stable.  troponin and BNP:  neg   ECHO: prelim by Dr Ramírez neg for RV size, no signs of strain   IV Heparin, switched  to Xarelto 15mg PO BID x 21 days, then will cont 20mg PO QD   Control pain with Tylenol and  Ultram PRN  Ambulate, compression stocking   Hematology eval appreciated, hypercoag work up sent  Pt to f/u with  Dr Renee for results          2. Current  Smoker   Pt counseled.  Nicotine patch   Offered  but declined.       3. Elevated hemoglobin,  Possibly due to smoking/dehydration   resolved   iron studies: no overload   F/u with Hematology outPt       4.  Leukocytosis, unspecified type. Likely reactive  trended down     Dispo: stable for d/c home today. Case management to assess Xarelto availability and if covered by insurance

## 2018-04-24 NOTE — DISCHARGE NOTE ADULT - CARE PROVIDERS DIRECT ADDRESSES
[email or web address]

## 2018-04-24 NOTE — DISCHARGE NOTE ADULT - CARE PLAN
Principal Discharge DX:	Pulmonary embolism  Goal:	resolve  Assessment and plan of treatment:	c/w Xarelto  f/u with cardio and hematologist  Secondary Diagnosis:	DVT (deep venous thrombosis)  Goal:	resolve  Assessment and plan of treatment:	c/w pain meds as needed

## 2018-04-24 NOTE — DISCHARGE NOTE ADULT - PATIENT PORTAL LINK FT
You can access the "[employer] Patient Portal, offered by [facility], by registering with the following website: [email or web address]

## 2018-04-25 PROBLEM — Z00.00 ENCOUNTER FOR PREVENTIVE HEALTH EXAMINATION: Status: ACTIVE | Noted: 2018-04-25

## 2018-04-25 LAB
AT III ACT/NOR PPP CHRO: 61 % — LOW (ref 85–135)
AT III AG PPP IA-MCNC: 19 MG/DL — LOW (ref 22–39)
CARDIOLIPIN AB SER-ACNC: NEGATIVE — SIGNIFICANT CHANGE UP

## 2018-04-26 ENCOUNTER — RECORD ABSTRACTING (OUTPATIENT)
Age: 58
End: 2018-04-26

## 2018-04-27 DIAGNOSIS — I82.402 ACUTE EMBOLISM AND THROMBOSIS OF UNSPECIFIED DEEP VEINS OF LEFT LOWER EXTREMITY: ICD-10-CM

## 2018-04-27 DIAGNOSIS — E86.0 DEHYDRATION: ICD-10-CM

## 2018-04-27 DIAGNOSIS — F17.200 NICOTINE DEPENDENCE, UNSPECIFIED, UNCOMPLICATED: ICD-10-CM

## 2018-04-27 DIAGNOSIS — I26.99 OTHER PULMONARY EMBOLISM WITHOUT ACUTE COR PULMONALE: ICD-10-CM

## 2018-05-01 LAB
APCR PPP: 2.63 RATIO — SIGNIFICANT CHANGE UP
PTR INTERPRETATION: SIGNIFICANT CHANGE UP

## 2018-05-02 PROBLEM — F17.200 CURRENT SMOKER: Status: ACTIVE | Noted: 2018-04-26

## 2018-05-02 PROBLEM — Z74.09 PROLONGED IMMOBILIZATION: Status: ACTIVE | Noted: 2018-05-02

## 2018-05-03 ENCOUNTER — LABORATORY RESULT (OUTPATIENT)
Age: 58
End: 2018-05-03

## 2018-05-03 ENCOUNTER — APPOINTMENT (OUTPATIENT)
Dept: HEMATOLOGY ONCOLOGY | Facility: CLINIC | Age: 58
End: 2018-05-03
Payer: COMMERCIAL

## 2018-05-03 VITALS
DIASTOLIC BLOOD PRESSURE: 82 MMHG | TEMPERATURE: 98.6 F | SYSTOLIC BLOOD PRESSURE: 136 MMHG | BODY MASS INDEX: 27.43 KG/M2 | WEIGHT: 181 LBS | HEIGHT: 68 IN | HEART RATE: 84 BPM

## 2018-05-03 DIAGNOSIS — Z74.09 OTHER REDUCED MOBILITY: ICD-10-CM

## 2018-05-03 DIAGNOSIS — F17.200 NICOTINE DEPENDENCE, UNSPECIFIED, UNCOMPLICATED: ICD-10-CM

## 2018-05-03 LAB
HCT VFR BLD CALC: 49 %
HGB BLD-MCNC: 16.7 G/DL
MCHC RBC-ENTMCNC: 31.8 PG
MCHC RBC-ENTMCNC: 34.1 GM/DL
MCV RBC AUTO: 93.2 FL
PLATELET # BLD AUTO: 352 K/UL
RBC # BLD: 5.26 M/UL
RBC # FLD: 12 %
WBC # FLD AUTO: 8.5 K/UL

## 2018-05-03 PROCEDURE — 36415 COLL VENOUS BLD VENIPUNCTURE: CPT

## 2018-05-03 PROCEDURE — 85025 COMPLETE CBC W/AUTO DIFF WBC: CPT

## 2018-05-03 PROCEDURE — 99244 OFF/OP CNSLTJ NEW/EST MOD 40: CPT | Mod: 25

## 2018-05-04 LAB
ALBUMIN SERPL ELPH-MCNC: 3.8 G/DL
ALP BLD-CCNC: 67 U/L
ALT SERPL-CCNC: 54 U/L
ANION GAP SERPL CALC-SCNC: 13 MMOL/L
APTT BLD: 40.5 SEC
AST SERPL-CCNC: 37 U/L
AT III PPP CHRO-ACNC: 127 %
B2 GLYCOPROT1 AB SER QL: NEGATIVE
BILIRUB SERPL-MCNC: 0.2 MG/DL
BUN SERPL-MCNC: 17 MG/DL
CALCIUM SERPL-MCNC: 9.8 MG/DL
CARDIOLIPIN AB SER IA-ACNC: NEGATIVE
CHLORIDE SERPL-SCNC: 105 MMOL/L
CO2 SERPL-SCNC: 23 MMOL/L
CREAT SERPL-MCNC: 1.07 MG/DL
GLUCOSE SERPL-MCNC: 94 MG/DL
INR PPP: 1.77 RATIO
POTASSIUM SERPL-SCNC: 4.3 MMOL/L
PROT C PPP CHRO-ACNC: 136 %
PROT SERPL-MCNC: 6.9 G/DL
PT BLD: 20.2 SEC
SODIUM SERPL-SCNC: 141 MMOL/L

## 2018-05-07 LAB — DNA PLOIDY SPEC FC-IMP: NORMAL

## 2018-05-09 ENCOUNTER — APPOINTMENT (OUTPATIENT)
Dept: CARDIOLOGY | Facility: CLINIC | Age: 58
End: 2018-05-09
Payer: COMMERCIAL

## 2018-05-09 ENCOUNTER — NON-APPOINTMENT (OUTPATIENT)
Age: 58
End: 2018-05-09

## 2018-05-09 VITALS
SYSTOLIC BLOOD PRESSURE: 137 MMHG | WEIGHT: 177.5 LBS | HEART RATE: 74 BPM | BODY MASS INDEX: 26.9 KG/M2 | HEIGHT: 68 IN | OXYGEN SATURATION: 95 % | DIASTOLIC BLOOD PRESSURE: 87 MMHG

## 2018-05-09 DIAGNOSIS — R03.0 ELEVATED BLOOD-PRESSURE READING, W/OUT DIAGNOSIS OF HYPERTENSION: ICD-10-CM

## 2018-05-09 PROCEDURE — 93000 ELECTROCARDIOGRAM COMPLETE: CPT

## 2018-05-09 PROCEDURE — 99214 OFFICE O/P EST MOD 30 MIN: CPT | Mod: 25

## 2018-05-09 RX ORDER — TRAMADOL HYDROCHLORIDE 50 MG/1
50 TABLET, COATED ORAL
Refills: 0 | Status: DISCONTINUED | COMMUNITY
Start: 2018-04-26 | End: 2018-05-09

## 2018-05-31 ENCOUNTER — APPOINTMENT (OUTPATIENT)
Dept: HEMATOLOGY ONCOLOGY | Facility: CLINIC | Age: 58
End: 2018-05-31
Payer: COMMERCIAL

## 2018-05-31 VITALS
HEIGHT: 68 IN | HEART RATE: 80 BPM | WEIGHT: 182 LBS | SYSTOLIC BLOOD PRESSURE: 149 MMHG | BODY MASS INDEX: 27.58 KG/M2 | DIASTOLIC BLOOD PRESSURE: 79 MMHG | TEMPERATURE: 98.6 F

## 2018-05-31 LAB
APTT BLD: 35 SEC
INR PPP: 1.37 RATIO
PT BLD: 15.6 SEC

## 2018-05-31 PROCEDURE — 99215 OFFICE O/P EST HI 40 MIN: CPT

## 2018-05-31 RX ORDER — RIVAROXABAN 15 MG/1
15 TABLET, FILM COATED ORAL
Qty: 42 | Refills: 0 | Status: DISCONTINUED | COMMUNITY
Start: 2018-04-26 | End: 2018-05-31

## 2018-06-07 LAB — PTR INTERP: NORMAL

## 2018-09-11 ENCOUNTER — APPOINTMENT (OUTPATIENT)
Dept: HEMATOLOGY ONCOLOGY | Facility: CLINIC | Age: 58
End: 2018-09-11
Payer: COMMERCIAL

## 2018-09-11 VITALS
WEIGHT: 193.5 LBS | HEART RATE: 74 BPM | SYSTOLIC BLOOD PRESSURE: 139 MMHG | HEIGHT: 68 IN | BODY MASS INDEX: 29.33 KG/M2 | DIASTOLIC BLOOD PRESSURE: 85 MMHG | TEMPERATURE: 98.5 F

## 2018-09-11 PROCEDURE — 99214 OFFICE O/P EST MOD 30 MIN: CPT

## 2018-12-13 ENCOUNTER — RX RENEWAL (OUTPATIENT)
Age: 58
End: 2018-12-13

## 2019-03-05 ENCOUNTER — APPOINTMENT (OUTPATIENT)
Dept: HEMATOLOGY ONCOLOGY | Facility: CLINIC | Age: 59
End: 2019-03-05
Payer: COMMERCIAL

## 2019-03-05 VITALS
DIASTOLIC BLOOD PRESSURE: 81 MMHG | BODY MASS INDEX: 28.79 KG/M2 | HEART RATE: 81 BPM | WEIGHT: 190 LBS | SYSTOLIC BLOOD PRESSURE: 164 MMHG | TEMPERATURE: 98.7 F | HEIGHT: 68 IN

## 2019-03-05 DIAGNOSIS — I82.409 ACUTE EMBOLISM AND THROMBOSIS OF UNSPECIFIED DEEP VEINS OF UNSPECIFIED LOWER EXTREMITY: ICD-10-CM

## 2019-03-05 DIAGNOSIS — I26.99 OTHER PULMONARY EMBOLISM W/OUT ACUTE COR PULMONALE: ICD-10-CM

## 2019-03-05 PROCEDURE — 99214 OFFICE O/P EST MOD 30 MIN: CPT

## 2019-03-05 NOTE — REASON FOR VISIT
[Follow-Up Visit] : a follow-up visit for [FreeTextEntry2] : Hypercoagulable state :left lower extremity DVT/bilateral pulmonary emboli

## 2019-03-05 NOTE — HISTORY OF PRESENT ILLNESS
[de-identified] : 58 M, heavy tobacco use, with history of  left lower extremity DVT and bilateral PE diagnosed April 22, 2018, thought to be secondary to prolonged immobilization (slight to [place]). Hypercoagulable workup negative. [FreeTextEntry1] : Xarelto ( April 2018- April 2019) [de-identified] : Patient returning for followup. He was last seen in September 2018 periods since May 2019 patient's wish to several to 20 mg daily with which he states he was compliant. Denies any respiratory symptoms or lower extremity pain. On April 22, 2018 a CT angiogram showed multiple segmental and subsegmental PE and the recommendation was for a year of anticoagulation. Hypercoagulable workup done last year was negative. Patient continues to smoke, but states he is trying to decrease the amount of cigarettes daily. Denies recent hospitalizations, B. symptoms, bleeding diathesis.  Appetite and weight preserved.

## 2019-03-05 NOTE — REVIEW OF SYSTEMS
[Shortness Of Breath] : no shortness of breath [Wheezing] : no wheezing [Cough] : no cough [SOB on Exertion] : shortness of breath during exertion [Negative] : Integumentary

## 2019-03-05 NOTE — ASSESSMENT
[FreeTextEntry1] : Mr. ELMORE's questions were answered to his satisfaction. He expressed his understanding and willingness to comply with the above recommendations, and does not need to return to the office, unless recurrent VTEs.

## 2020-05-19 NOTE — PATIENT PROFILE ADULT. - LIVES WITH, PROFILE
[facility]    Inpatient Cardiology Consultation Note    Robby Eldridge Patient Status:  Emergency    1928 MRN [id number]   [address] Attending Leticia Jones MD   Hosp Day # 0 [facility] Fatou [facility] Card[facility]    --------------------------------------------------------------------------------------------------------------------------------  10 point ROS performed.  All negative, except as documented above  ROS    History:  Past Medical History:   Diagn Personal history of malignant neoplasm of prostate    • Polymyalgia ([facility] [address]     rheumatica   • [facility] 12/6/2019   • Rectal bleeding 11/4/2019   • Rhabdomyolysis    • Rotator cuff sprain    • Severe sepsis (HCC)    • Stress incont DIARRHEA  (Not in a hospital admission)      Physical Exam:   BP (!) 164/92   Pulse 75   Temp 98.5 °F (36.9 °C) (Oral)   Resp 16   Wt 180 lb (81.6 kg)   SpO2 97%   BMI 28.19 kg/m²       General: Alert and oriented x3  HEENT: PERRL, moist conjunctivae.  MMM, spouse

## 2021-10-07 ENCOUNTER — INPATIENT (INPATIENT)
Facility: HOSPITAL | Age: 61
LOS: 1 days | Discharge: HOME | DRG: 247 | End: 2021-10-09
Attending: INTERNAL MEDICINE | Admitting: INTERNAL MEDICINE
Payer: COMMERCIAL

## 2021-10-07 VITALS
HEART RATE: 88 BPM | TEMPERATURE: 99 F | OXYGEN SATURATION: 96 % | RESPIRATION RATE: 18 BRPM | DIASTOLIC BLOOD PRESSURE: 104 MMHG | HEIGHT: 70 IN | SYSTOLIC BLOOD PRESSURE: 160 MMHG

## 2021-10-07 DIAGNOSIS — Z90.49 ACQUIRED ABSENCE OF OTHER SPECIFIED PARTS OF DIGESTIVE TRACT: Chronic | ICD-10-CM

## 2021-10-07 DIAGNOSIS — I21.3 ST ELEVATION (STEMI) MYOCARDIAL INFARCTION OF UNSPECIFIED SITE: ICD-10-CM

## 2021-10-07 LAB
A1C WITH ESTIMATED AVERAGE GLUCOSE RESULT: 5.4 % — SIGNIFICANT CHANGE UP (ref 4–5.6)
ALBUMIN SERPL ELPH-MCNC: 3 G/DL — LOW (ref 3.3–5)
ALBUMIN SERPL ELPH-MCNC: 3.4 G/DL — SIGNIFICANT CHANGE UP (ref 3.3–5)
ALP SERPL-CCNC: 63 U/L — SIGNIFICANT CHANGE UP (ref 40–120)
ALP SERPL-CCNC: 76 U/L — SIGNIFICANT CHANGE UP (ref 40–120)
ALT FLD-CCNC: 20 U/L — SIGNIFICANT CHANGE UP (ref 12–78)
ALT FLD-CCNC: 22 U/L — SIGNIFICANT CHANGE UP (ref 12–78)
ANION GAP SERPL CALC-SCNC: 3 MMOL/L — LOW (ref 5–17)
ANION GAP SERPL CALC-SCNC: 6 MMOL/L — SIGNIFICANT CHANGE UP (ref 5–17)
APTT BLD: 29.9 SEC — SIGNIFICANT CHANGE UP (ref 27.5–35.5)
AST SERPL-CCNC: 14 U/L — LOW (ref 15–37)
AST SERPL-CCNC: 27 U/L — SIGNIFICANT CHANGE UP (ref 15–37)
BASOPHILS # BLD AUTO: 0.09 K/UL — SIGNIFICANT CHANGE UP (ref 0–0.2)
BASOPHILS NFR BLD AUTO: 0.9 % — SIGNIFICANT CHANGE UP (ref 0–2)
BILIRUB SERPL-MCNC: 0.3 MG/DL — SIGNIFICANT CHANGE UP (ref 0.2–1.2)
BILIRUB SERPL-MCNC: 0.4 MG/DL — SIGNIFICANT CHANGE UP (ref 0.2–1.2)
BUN SERPL-MCNC: 15 MG/DL — SIGNIFICANT CHANGE UP (ref 7–23)
BUN SERPL-MCNC: 16 MG/DL — SIGNIFICANT CHANGE UP (ref 7–23)
CALCIUM SERPL-MCNC: 8.7 MG/DL — SIGNIFICANT CHANGE UP (ref 8.5–10.1)
CALCIUM SERPL-MCNC: 9.1 MG/DL — SIGNIFICANT CHANGE UP (ref 8.5–10.1)
CHLORIDE SERPL-SCNC: 108 MMOL/L — SIGNIFICANT CHANGE UP (ref 96–108)
CHLORIDE SERPL-SCNC: 109 MMOL/L — HIGH (ref 96–108)
CHOLEST SERPL-MCNC: 211 MG/DL — HIGH
CO2 SERPL-SCNC: 25 MMOL/L — SIGNIFICANT CHANGE UP (ref 22–31)
CO2 SERPL-SCNC: 28 MMOL/L — SIGNIFICANT CHANGE UP (ref 22–31)
COVID-19 SPIKE DOMAIN AB INTERP: POSITIVE
COVID-19 SPIKE DOMAIN ANTIBODY RESULT: >250 U/ML — HIGH
CREAT SERPL-MCNC: 1.08 MG/DL — SIGNIFICANT CHANGE UP (ref 0.5–1.3)
CREAT SERPL-MCNC: 1.27 MG/DL — SIGNIFICANT CHANGE UP (ref 0.5–1.3)
EOSINOPHIL # BLD AUTO: 0.33 K/UL — SIGNIFICANT CHANGE UP (ref 0–0.5)
EOSINOPHIL NFR BLD AUTO: 3.3 % — SIGNIFICANT CHANGE UP (ref 0–6)
ESTIMATED AVERAGE GLUCOSE: 108 MG/DL — SIGNIFICANT CHANGE UP (ref 68–114)
GLUCOSE SERPL-MCNC: 109 MG/DL — HIGH (ref 70–99)
GLUCOSE SERPL-MCNC: 160 MG/DL — HIGH (ref 70–99)
HCT VFR BLD CALC: 47.1 % — SIGNIFICANT CHANGE UP (ref 39–50)
HCT VFR BLD CALC: 50.1 % — HIGH (ref 39–50)
HCV AB S/CO SERPL IA: 0.17 S/CO — SIGNIFICANT CHANGE UP (ref 0–0.99)
HCV AB SERPL-IMP: SIGNIFICANT CHANGE UP
HDLC SERPL-MCNC: 39 MG/DL — LOW
HGB BLD-MCNC: 15.8 G/DL — SIGNIFICANT CHANGE UP (ref 13–17)
HGB BLD-MCNC: 16.9 G/DL — SIGNIFICANT CHANGE UP (ref 13–17)
IMM GRANULOCYTES NFR BLD AUTO: 0.2 % — SIGNIFICANT CHANGE UP (ref 0–1.5)
INR BLD: 0.99 RATIO — SIGNIFICANT CHANGE UP (ref 0.88–1.16)
LIPID PNL WITH DIRECT LDL SERPL: 146 MG/DL — HIGH
LYMPHOCYTES # BLD AUTO: 3.26 K/UL — SIGNIFICANT CHANGE UP (ref 1–3.3)
LYMPHOCYTES # BLD AUTO: 32.2 % — SIGNIFICANT CHANGE UP (ref 13–44)
MAGNESIUM SERPL-MCNC: 2.3 MG/DL — SIGNIFICANT CHANGE UP (ref 1.6–2.6)
MCHC RBC-ENTMCNC: 32.1 PG — SIGNIFICANT CHANGE UP (ref 27–34)
MCHC RBC-ENTMCNC: 32.2 PG — SIGNIFICANT CHANGE UP (ref 27–34)
MCHC RBC-ENTMCNC: 33.5 GM/DL — SIGNIFICANT CHANGE UP (ref 32–36)
MCHC RBC-ENTMCNC: 33.7 GM/DL — SIGNIFICANT CHANGE UP (ref 32–36)
MCV RBC AUTO: 95.2 FL — SIGNIFICANT CHANGE UP (ref 80–100)
MCV RBC AUTO: 96.1 FL — SIGNIFICANT CHANGE UP (ref 80–100)
MONOCYTES # BLD AUTO: 0.81 K/UL — SIGNIFICANT CHANGE UP (ref 0–0.9)
MONOCYTES NFR BLD AUTO: 8 % — SIGNIFICANT CHANGE UP (ref 2–14)
NEUTROPHILS # BLD AUTO: 5.6 K/UL — SIGNIFICANT CHANGE UP (ref 1.8–7.4)
NEUTROPHILS NFR BLD AUTO: 55.4 % — SIGNIFICANT CHANGE UP (ref 43–77)
NON HDL CHOLESTEROL: 173 MG/DL — HIGH
PHOSPHATE SERPL-MCNC: 3.5 MG/DL — SIGNIFICANT CHANGE UP (ref 2.5–4.5)
PLATELET # BLD AUTO: 227 K/UL — SIGNIFICANT CHANGE UP (ref 150–400)
PLATELET # BLD AUTO: 244 K/UL — SIGNIFICANT CHANGE UP (ref 150–400)
POTASSIUM SERPL-MCNC: 3.8 MMOL/L — SIGNIFICANT CHANGE UP (ref 3.5–5.3)
POTASSIUM SERPL-MCNC: 4.6 MMOL/L — SIGNIFICANT CHANGE UP (ref 3.5–5.3)
POTASSIUM SERPL-SCNC: 3.8 MMOL/L — SIGNIFICANT CHANGE UP (ref 3.5–5.3)
POTASSIUM SERPL-SCNC: 4.6 MMOL/L — SIGNIFICANT CHANGE UP (ref 3.5–5.3)
PROT SERPL-MCNC: 6.4 GM/DL — SIGNIFICANT CHANGE UP (ref 6–8.3)
PROT SERPL-MCNC: 7.1 GM/DL — SIGNIFICANT CHANGE UP (ref 6–8.3)
PROTHROM AB SERPL-ACNC: 11.6 SEC — SIGNIFICANT CHANGE UP (ref 10.6–13.6)
RBC # BLD: 4.9 M/UL — SIGNIFICANT CHANGE UP (ref 4.2–5.8)
RBC # BLD: 5.26 M/UL — SIGNIFICANT CHANGE UP (ref 4.2–5.8)
RBC # FLD: 14.2 % — SIGNIFICANT CHANGE UP (ref 10.3–14.5)
RBC # FLD: 14.4 % — SIGNIFICANT CHANGE UP (ref 10.3–14.5)
SARS-COV-2 IGG+IGM SERPL QL IA: >250 U/ML — HIGH
SARS-COV-2 IGG+IGM SERPL QL IA: POSITIVE
SODIUM SERPL-SCNC: 139 MMOL/L — SIGNIFICANT CHANGE UP (ref 135–145)
SODIUM SERPL-SCNC: 140 MMOL/L — SIGNIFICANT CHANGE UP (ref 135–145)
TRIGL SERPL-MCNC: 136 MG/DL — SIGNIFICANT CHANGE UP
TROPONIN I, HIGH SENSITIVITY RESULT: 6297.8 NG/L — HIGH
TROPONIN I, HIGH SENSITIVITY RESULT: 6790.9 NG/L — HIGH
TROPONIN I, HIGH SENSITIVITY RESULT: 990.6 NG/L — HIGH
WBC # BLD: 10.11 K/UL — SIGNIFICANT CHANGE UP (ref 3.8–10.5)
WBC # BLD: 9.76 K/UL — SIGNIFICANT CHANGE UP (ref 3.8–10.5)
WBC # FLD AUTO: 10.11 K/UL — SIGNIFICANT CHANGE UP (ref 3.8–10.5)
WBC # FLD AUTO: 9.76 K/UL — SIGNIFICANT CHANGE UP (ref 3.8–10.5)

## 2021-10-07 PROCEDURE — C1725: CPT

## 2021-10-07 PROCEDURE — 85027 COMPLETE CBC AUTOMATED: CPT

## 2021-10-07 PROCEDURE — 99291 CRITICAL CARE FIRST HOUR: CPT

## 2021-10-07 PROCEDURE — 80048 BASIC METABOLIC PNL TOTAL CA: CPT

## 2021-10-07 PROCEDURE — 83036 HEMOGLOBIN GLYCOSYLATED A1C: CPT

## 2021-10-07 PROCEDURE — 36415 COLL VENOUS BLD VENIPUNCTURE: CPT

## 2021-10-07 PROCEDURE — 83735 ASSAY OF MAGNESIUM: CPT

## 2021-10-07 PROCEDURE — 86901 BLOOD TYPING SEROLOGIC RH(D): CPT

## 2021-10-07 PROCEDURE — 85025 COMPLETE CBC W/AUTO DIFF WBC: CPT

## 2021-10-07 PROCEDURE — 86769 SARS-COV-2 COVID-19 ANTIBODY: CPT

## 2021-10-07 PROCEDURE — C1874: CPT

## 2021-10-07 PROCEDURE — 80061 LIPID PANEL: CPT

## 2021-10-07 PROCEDURE — 93306 TTE W/DOPPLER COMPLETE: CPT | Mod: 26

## 2021-10-07 PROCEDURE — 86900 BLOOD TYPING SEROLOGIC ABO: CPT

## 2021-10-07 PROCEDURE — 93458 L HRT ARTERY/VENTRICLE ANGIO: CPT | Mod: 59

## 2021-10-07 PROCEDURE — 86803 HEPATITIS C AB TEST: CPT

## 2021-10-07 PROCEDURE — C1894: CPT

## 2021-10-07 PROCEDURE — 86850 RBC ANTIBODY SCREEN: CPT

## 2021-10-07 PROCEDURE — 84100 ASSAY OF PHOSPHORUS: CPT

## 2021-10-07 PROCEDURE — 93005 ELECTROCARDIOGRAM TRACING: CPT

## 2021-10-07 PROCEDURE — 93306 TTE W/DOPPLER COMPLETE: CPT

## 2021-10-07 PROCEDURE — 99152 MOD SED SAME PHYS/QHP 5/>YRS: CPT

## 2021-10-07 PROCEDURE — C1887: CPT

## 2021-10-07 PROCEDURE — 93010 ELECTROCARDIOGRAM REPORT: CPT

## 2021-10-07 PROCEDURE — 99153 MOD SED SAME PHYS/QHP EA: CPT

## 2021-10-07 PROCEDURE — C9600: CPT | Mod: LD

## 2021-10-07 PROCEDURE — 99233 SBSQ HOSP IP/OBS HIGH 50: CPT

## 2021-10-07 PROCEDURE — 84484 ASSAY OF TROPONIN QUANT: CPT

## 2021-10-07 PROCEDURE — 80053 COMPREHEN METABOLIC PANEL: CPT

## 2021-10-07 PROCEDURE — C1769: CPT

## 2021-10-07 RX ORDER — TICAGRELOR 90 MG/1
180 TABLET ORAL ONCE
Refills: 0 | Status: COMPLETED | OUTPATIENT
Start: 2021-10-07 | End: 2021-10-07

## 2021-10-07 RX ORDER — TICAGRELOR 90 MG/1
90 TABLET ORAL EVERY 12 HOURS
Refills: 0 | Status: DISCONTINUED | OUTPATIENT
Start: 2021-10-07 | End: 2021-10-09

## 2021-10-07 RX ORDER — MORPHINE SULFATE 50 MG/1
2 CAPSULE, EXTENDED RELEASE ORAL
Refills: 0 | Status: DISCONTINUED | OUTPATIENT
Start: 2021-10-07 | End: 2021-10-09

## 2021-10-07 RX ORDER — ENOXAPARIN SODIUM 100 MG/ML
40 INJECTION SUBCUTANEOUS DAILY
Refills: 0 | Status: DISCONTINUED | OUTPATIENT
Start: 2021-10-07 | End: 2021-10-09

## 2021-10-07 RX ORDER — ATORVASTATIN CALCIUM 80 MG/1
80 TABLET, FILM COATED ORAL AT BEDTIME
Refills: 0 | Status: DISCONTINUED | OUTPATIENT
Start: 2021-10-07 | End: 2021-10-09

## 2021-10-07 RX ORDER — HEPARIN SODIUM 5000 [USP'U]/ML
5000 INJECTION INTRAVENOUS; SUBCUTANEOUS ONCE
Refills: 0 | Status: COMPLETED | OUTPATIENT
Start: 2021-10-07 | End: 2021-10-07

## 2021-10-07 RX ORDER — LOSARTAN POTASSIUM 100 MG/1
25 TABLET, FILM COATED ORAL DAILY
Refills: 0 | Status: DISCONTINUED | OUTPATIENT
Start: 2021-10-07 | End: 2021-10-09

## 2021-10-07 RX ORDER — ONDANSETRON 8 MG/1
4 TABLET, FILM COATED ORAL ONCE
Refills: 0 | Status: COMPLETED | OUTPATIENT
Start: 2021-10-07 | End: 2021-10-07

## 2021-10-07 RX ORDER — INFLUENZA VIRUS VACCINE 15; 15; 15; 15 UG/.5ML; UG/.5ML; UG/.5ML; UG/.5ML
0.5 SUSPENSION INTRAMUSCULAR ONCE
Refills: 0 | Status: COMPLETED | OUTPATIENT
Start: 2021-10-07 | End: 2021-10-07

## 2021-10-07 RX ORDER — ASPIRIN/CALCIUM CARB/MAGNESIUM 324 MG
81 TABLET ORAL DAILY
Refills: 0 | Status: DISCONTINUED | OUTPATIENT
Start: 2021-10-07 | End: 2021-10-09

## 2021-10-07 RX ORDER — METOPROLOL TARTRATE 50 MG
25 TABLET ORAL DAILY
Refills: 0 | Status: DISCONTINUED | OUTPATIENT
Start: 2021-10-07 | End: 2021-10-09

## 2021-10-07 RX ADMIN — TICAGRELOR 90 MILLIGRAM(S): 90 TABLET ORAL at 22:30

## 2021-10-07 RX ADMIN — HEPARIN SODIUM 5000 UNIT(S): 5000 INJECTION INTRAVENOUS; SUBCUTANEOUS at 00:37

## 2021-10-07 RX ADMIN — TICAGRELOR 90 MILLIGRAM(S): 90 TABLET ORAL at 11:20

## 2021-10-07 RX ADMIN — ONDANSETRON 4 MILLIGRAM(S): 8 TABLET, FILM COATED ORAL at 00:30

## 2021-10-07 RX ADMIN — Medication 25 MILLIGRAM(S): at 11:20

## 2021-10-07 RX ADMIN — Medication 81 MILLIGRAM(S): at 11:20

## 2021-10-07 RX ADMIN — LOSARTAN POTASSIUM 25 MILLIGRAM(S): 100 TABLET, FILM COATED ORAL at 11:20

## 2021-10-07 RX ADMIN — ENOXAPARIN SODIUM 40 MILLIGRAM(S): 100 INJECTION SUBCUTANEOUS at 11:19

## 2021-10-07 RX ADMIN — TICAGRELOR 180 MILLIGRAM(S): 90 TABLET ORAL at 00:37

## 2021-10-07 RX ADMIN — MORPHINE SULFATE 2 MILLIGRAM(S): 50 CAPSULE, EXTENDED RELEASE ORAL at 00:30

## 2021-10-07 RX ADMIN — ATORVASTATIN CALCIUM 80 MILLIGRAM(S): 80 TABLET, FILM COATED ORAL at 22:30

## 2021-10-07 NOTE — ED PROVIDER NOTE - PHYSICAL EXAMINATION
Gen:  ill appearing in distress with chest pain  Head:  NC/AT  HEENT: pupils perrl,no pharyngeal erythema, uvula midline  Cardiac: S1S2, RRR  Abd: Soft, non tender  Resp: No distress, CTA   musculoskeletal:: no deformities, no swelling, strength +5/+5  Skin: warm and dry as visualized, no rashes  Neuro: SHAH, cn2-12, aao x 4  Psych:alert, cooperative, appropriate mood and affect for situation

## 2021-10-07 NOTE — CONSULT NOTE ADULT - SUBJECTIVE AND OBJECTIVE BOX
Date of Consult: 10/7/2021    CHIEF COMPLAINT: chest pain    HISTORY OF PRESENT ILLNESS:  Mr. Weinstein is a 62 y/o male with history of prior PE (on rivaroxaban at that time), limited medical follow up who presented with acute onset chest pain starting prior to midnight. On further discussion, he did have pain on Saturday which lasted for a few hours and resolved on its own. He was found to have intital EKG that showed normal sinus rhythm with Q waves anteroseptally V1-V3 significant for prior infarct, at 12:17 AM. On repeat EKG at 12:35 AM, new ST elevations developed in V4, V5, V6 with reciprocol depressions, significant for STEMI. Cath lab was activated at this time. Patient received nitro, Brilinta aspirin, heparin in ER with improvement in chest pain and taken urgently to cath lab where he was found to have proximal LAD 99%, with SANDEEP 3 flow. SANDEEP 3 flow restored with 1 stent. Chest pain free at end of procedure.     Allergies    No Known Allergies    Intolerances      MEDICATIONS:  morphine  - Injectable 2 milliGRAM(s) IV Push every 5 minutes PRN    PAST MEDICAL & SURGICAL HISTORY:  No pertinent past medical history    History of appendectomy        FAMILY HISTORY:  Family history of hypertension in mother (Mother)    SOCIAL HISTORY:    [ ] Non-smoker  [ ] Smoker  [ ] Alcohol      REVIEW OF SYSTEMS:  See HPI. Otherwise, 10 point ROS done and otherwise negative.    PHYSICAL EXAM:  T(C): 36.5 (10-07-21 @ 02:30), Max: 37.1 (10-07-21 @ 00:15)  HR: 78 (10-07-21 @ 02:30) (78 - 88)  BP: 143/93 (10-07-21 @ 00:49) (143/93 - 160/104)  RR: 94 (10-07-21 @ 02:30) (10 - 94)  SpO2: 100% (10-07-21 @ 00:49) (96% - 100%)  Wt(kg): --  I&O's Summary      Appearance: Normal	  HEENT:   Normal oral mucosa, PERRL, EOMI	  Lymphatic: No lymphadenopathy  Cardiovascular: Normal S1 S2, No JVD, No murmurs, No edema  Respiratory: Lungs clear to auscultation	  Psychiatry: A & O x 3, Mood & affect appropriate  Gastrointestinal:  Soft, Non-tender, + BS	  Skin: No rashes, No ecchymoses, No cyanosis	  Neurologic: Non-focal  Extremities: Normal range of motion, No clubbing, cyanosis or edema  Vascular: Peripheral pulses palpable 2+ bilaterally    LABS:	 	    CBC Full  -  ( 07 Oct 2021 00:20 )  WBC Count : 10.11 K/uL  Hemoglobin : 16.9 g/dL  Hematocrit : 50.1 %  Platelet Count - Automated : 244 K/uL  Mean Cell Volume : 95.2 fl  Mean Cell Hemoglobin : 32.1 pg  Mean Cell Hemoglobin Concentration : 33.7 gm/dL  Auto Neutrophil # : 5.60 K/uL  Auto Lymphocyte # : 3.26 K/uL  Auto Monocyte # : 0.81 K/uL  Auto Eosinophil # : 0.33 K/uL  Auto Basophil # : 0.09 K/uL  Auto Neutrophil % : 55.4 %  Auto Lymphocyte % : 32.2 %  Auto Monocyte % : 8.0 %  Auto Eosinophil % : 3.3 %  Auto Basophil % : 0.9 %    10-07    139  |  108  |  16  ----------------------------<  160<H>  3.8   |  25  |  1.27    Ca    9.1      07 Oct 2021 00:20    TPro  7.1  /  Alb  3.4  /  TBili  0.3  /  DBili  x   /  AST  14<L>  /  ALT  22  /  AlkPhos  76  10-07    proBNP:   Lipid Profile:   HgA1c:   TSH:     CARDIAC MARKERS:    TELEMETRY: 	    ECG:  	  RADIOLOGY:  OTHER: 	    PREVIOUS DIAGNOSTIC TESTING:    [ ] Echocardiogram:  [ ]  Catheterization:  pLAD 99%, ostial Cx 80% (nondominant territory), mid RCA 90% (dominant)  [ ] Stress Test:  	  	  ASSESSMENT/PLAN: 	   Date of Consult: 10/7/2021    CHIEF COMPLAINT: chest pain    HISTORY OF PRESENT ILLNESS:  Mr. Weinstein is a 62 y/o male with history of prior PE (on rivaroxaban at that time), limited medical follow up who presented with acute onset chest pain starting prior to midnight. On further discussion, he did have pain on Saturday which lasted for a few hours and resolved on its own. He was found to have intital EKG that showed normal sinus rhythm with Q waves anteroseptally V1-V3 significant for prior infarct, at 12:17 AM. On repeat EKG at 12:35 AM, new ST elevations developed in V4, V5, V6 with reciprocol depressions, significant for STEMI. Cath lab was activated at this time. Patient received nitro, Brilinta aspirin, heparin in ER with improvement in chest pain and taken urgently to cath lab where he was found to have proximal LAD 99%, with SANDEEP 3 flow. SANDEEP 3 flow restored with 1 stent. Chest pain free at end of procedure.     Allergies    No Known Allergies    Intolerances      MEDICATIONS:  morphine  - Injectable 2 milliGRAM(s) IV Push every 5 minutes PRN    PAST MEDICAL & SURGICAL HISTORY:  No pertinent past medical history    History of appendectomy        FAMILY HISTORY:  Family history of hypertension in mother (Mother)    SOCIAL HISTORY:    [ ] Non-smoker  [ ] Smoker  [ ] Alcohol      REVIEW OF SYSTEMS:  See HPI. Otherwise, 10 point ROS done and otherwise negative.    PHYSICAL EXAM:  T(C): 36.5 (10-07-21 @ 02:30), Max: 37.1 (10-07-21 @ 00:15)  HR: 78 (10-07-21 @ 02:30) (78 - 88)  BP: 143/93 (10-07-21 @ 00:49) (143/93 - 160/104)  RR: 94 (10-07-21 @ 02:30) (10 - 94)  SpO2: 100% (10-07-21 @ 00:49) (96% - 100%)  Wt(kg): --  I&O's Summary      Appearance: Normal	  HEENT:   Normal oral mucosa, PERRL, EOMI	  Lymphatic: No lymphadenopathy  Cardiovascular: Normal S1 S2, No JVD, No murmurs, No edema  Respiratory: Lungs clear to auscultation	  Psychiatry: A & O x 3, Mood & affect appropriate  Gastrointestinal:  Soft, Non-tender, + BS	  Skin: No rashes, No ecchymoses, No cyanosis	  Neurologic: Non-focal  Extremities: Normal range of motion, No clubbing, cyanosis or edema  Vascular: Peripheral pulses palpable 2+ bilaterally    LABS:	 	    CBC Full  -  ( 07 Oct 2021 00:20 )  WBC Count : 10.11 K/uL  Hemoglobin : 16.9 g/dL  Hematocrit : 50.1 %  Platelet Count - Automated : 244 K/uL  Mean Cell Volume : 95.2 fl  Mean Cell Hemoglobin : 32.1 pg  Mean Cell Hemoglobin Concentration : 33.7 gm/dL  Auto Neutrophil # : 5.60 K/uL  Auto Lymphocyte # : 3.26 K/uL  Auto Monocyte # : 0.81 K/uL  Auto Eosinophil # : 0.33 K/uL  Auto Basophil # : 0.09 K/uL  Auto Neutrophil % : 55.4 %  Auto Lymphocyte % : 32.2 %  Auto Monocyte % : 8.0 %  Auto Eosinophil % : 3.3 %  Auto Basophil % : 0.9 %    10-07    139  |  108  |  16  ----------------------------<  160<H>  3.8   |  25  |  1.27    Ca    9.1      07 Oct 2021 00:20    TPro  7.1  /  Alb  3.4  /  TBili  0.3  /  DBili  x   /  AST  14<L>  /  ALT  22  /  AlkPhos  76  10-07    proBNP:   Lipid Profile:   HgA1c:   TSH:     CARDIAC MARKERS:    TELEMETRY: 	    ECG:  	  RADIOLOGY:  OTHER: 	    PREVIOUS DIAGNOSTIC TESTING:    [ ] Echocardiogram:  [ ]  Catheterization:  pLAD 99%, ostial Cx 80% (nondominant territory), mid RCA 90% (dominant). V gram with anterior wall hypokinesis but EF preserved.   [ ] Stress Test:  	  	  ASSESSMENT/PLAN:

## 2021-10-07 NOTE — H&P ADULT - NSHPPHYSICALEXAM_GEN_ALL_CORE
General: Normal appearing in no acute distress resting comfortably in bed.   Head: Normocephalic, atraumatic.   EENT: Sclera white. PERRL, EOM intact. Secretions normal. no cervical lymphadenopathy  PULM: Breath sounds clear to auscultation symmetrically throughout all lung fields. No wheezing, rhonchi, or rales. No use of accessory muscles.  CVS: Regular rate and rhythm. No murmurs or rubs. Pulses 2+ on upper and lower extremities bilaterally.   GI: nondistended with bowel sounds normoactive in all four quadrants. No tenderness to light or deep palpation.   Neuro: A&O x 4. nonfocal  Skin: No lesions, rashes, ulcers. Extremities equally warm bilaterally.

## 2021-10-07 NOTE — ED ADULT NURSE NOTE - CHIEF COMPLAINT QUOTE
Pt presents to ED for chest pain starting at 2300 radiating to L arm. pt diaphoretic. no known cardiac hx. ASA and 1 nitroglycerin given PTA by EMS. ekg performed. code STEMI activated 0012. MD Grande at bedside.

## 2021-10-07 NOTE — CONSULT NOTE ADULT - ASSESSMENT
61 year old man with STEMI presentation, with proximal LAD as culprit vessel. 1 MERRILL placed resulting in restoration of flow.  -continue aspirin 81 mg and ticagrelor 90 mg BID  -please start atorvastatin 80 mg qhs, high intensity   -start metoprolol 25 mg XL   -follow up AM Cr and if continues to be WNL with elevated BP, plan to start ARB   -obtain TTE for wall motion, ejection fraction  -obtain lipids, a1c, etc   -plan for stage to RCA on Friday   -interventional cardiology to follow     Thank you for allowing me to participate in the care of your patient. Feel free to contact me if any clinical issues arise.    Aris Mack MD, FACC, [facility]   Interventional Cardiovascular Attending  [facility], [facility]   [facility]   Office ([place]): [phone]  Office ([place]): [phone]  Email: [email or web address]

## 2021-10-07 NOTE — PROGRESS NOTE ADULT - SUBJECTIVE AND OBJECTIVE BOX
HPI:  60 y/o male with pmhx of prior PE on xarelto (completed anticoagulation three years ago), poor medical follow up presented with acute onset chest pain found to have DIANNE in V5, V6. Received nitro, brilinta, aspirin, heparin in ER with improvement in chest pain and taken urgently to cath lab where he had one stent placed to pLAD. DIANNE improved post cath. access obtained via right radial artery.     10/7: Patient seen in the CCU, no chest pain of SOB, Cath site clean R Wrist       PAST MEDICAL & SURGICAL HISTORY:  No pertinent past medical history    History of appendectomy        FAMILY HISTORY:  Family history of hypertension in mother (Mother)        Social Hx:    Allergies    No Known Allergies    Intolerances        Height (cm): 177.8 (10-07 @ 00:15)  Weight (kg): 82.2 (10-07 @ 00:28)  BMI (kg/m2): 26 (10-07 @ 00:28)    ICU Vital Signs Last 24 Hrs  T(C): 36.4 (07 Oct 2021 05:00), Max: 37.1 (07 Oct 2021 00:15)  T(F): 97.5 (07 Oct 2021 05:00), Max: 98.7 (07 Oct 2021 00:15)  HR: 71 (07 Oct 2021 08:00) (71 - 88)  BP: 122/81 (07 Oct 2021 08:00) (122/73 - 160/104)  BP(mean): 89 (07 Oct 2021 08:00) (83 - 93)  ABP: --  ABP(mean): --  RR: 16 (07 Oct 2021 08:00) (10 - 94)  SpO2: 97% (07 Oct 2021 08:00) (92% - 100%)          I&O's Summary    06 Oct 2021 07:01  -  07 Oct 2021 07:00  --------------------------------------------------------  IN: 0 mL / OUT: 550 mL / NET: -550 mL                              15.8   9.76  )-----------( 227      ( 07 Oct 2021 06:37 )             47.1       10-07    140  |  109<H>  |  15  ----------------------------<  109<H>  4.6   |  28  |  1.08    Ca    8.7      07 Oct 2021 06:37  Phos  3.5     10-07  Mg     2.3     10-07    TPro  6.4  /  Alb  3.0<L>  /  TBili  0.4  /  DBili  x   /  AST  27  /  ALT  20  /  AlkPhos  63  10-07                    MEDICATIONS  (STANDING):  aspirin  chewable 81 milliGRAM(s) Oral daily  atorvastatin 80 milliGRAM(s) Oral at bedtime  enoxaparin Injectable 40 milliGRAM(s) SubCutaneous daily  influenza   Vaccine 0.5 milliLiter(s) IntraMuscular once  metoprolol succinate ER 25 milliGRAM(s) Oral daily  ticagrelor 90 milliGRAM(s) Oral every 12 hours    MEDICATIONS  (PRN):  morphine  - Injectable 2 milliGRAM(s) IV Push every 5 minutes PRN Chest Pain      DVT Prophylaxis: Lovenox    Advanced Directives:  Discussed with:    Visit Information:      ** Time is exclusive of billed procedures and/or teaching and/or routine family updates.

## 2021-10-07 NOTE — H&P ADULT - HISTORY OF PRESENT ILLNESS
60 y/o male with no pmhx (poor medical follow up) presented with acute onset chest pain found to have DIANNE in V5, V6. Received nitro, brilinta, aspirin, heparin in ER with improvement in chest pain and taken urgently to cath lab 62 y/o male with pmhx of prior PE on xarelto (completed anticoagulation three years ago), poor medical follow up presented with acute onset chest pain found to have DIANNE in V5, V6. Received nitro, brilinta, aspirin, heparin in ER with improvement in chest pain and taken urgently to cath lab where he had one stent placed to pLAD. DIANNE improved post cath. 62 y/o male with pmhx of prior PE on xarelto (completed anticoagulation three years ago), poor medical follow up presented with acute onset chest pain found to have DIANNE in V5, V6. Received nitro, brilinta, aspirin, heparin in ER with improvement in chest pain and taken urgently to cath lab where he had one stent placed to pLAD. DIANNE improved post cath. access obtained via right radial artery.

## 2021-10-07 NOTE — ED PROVIDER NOTE - NSICDXFAMILYHX_GEN_ALL_CORE_FT
FAMILY HISTORY:  Mother  Still living? Unknown  Family history of hypertension in mother, Age at diagnosis: Age Unknown

## 2021-10-07 NOTE — ED PROVIDER NOTE - CLINICAL SUMMARY MEDICAL DECISION MAKING FREE TEXT BOX
chest pain, ekg and labs showing stemi from ems ekg will get labs and call interventionalist on call Double Island Pedicle Flap Text: The defect edges were debeveled with a #15 scalpel blade.  Given the location of the defect, shape of the defect and the proximity to free margins a double island pedicle advancement flap was deemed most appropriate.  Using a sterile surgical marker, an appropriate advancement flap was drawn incorporating the defect, outlining the appropriate donor tissue and placing the expected incisions within the relaxed skin tension lines where possible.    The area thus outlined was incised deep to adipose tissue with a #15 scalpel blade.  The skin margins were undermined to an appropriate distance in all directions around the primary defect and laterally outward around the island pedicle utilizing iris scissors.  There was minimal undermining beneath the pedicle flap.

## 2021-10-07 NOTE — ED PROVIDER NOTE - OBJECTIVE STATEMENT
62 yo male biba from home for acute onset of left sided chest pain radiating down left arm for approximately 1 hour occuring at rest. Pt called in from ems for stemi. Pt received SL Nitro and 4 aspirin pta without improvement in the pain. Pt still having 10/10 pain on arrival

## 2021-10-07 NOTE — PROGRESS NOTE ADULT - SUBJECTIVE AND OBJECTIVE BOX
NP PROGRESS NOTE:    Pt was seen and examined at the bedside, no overnight events, + mild chest discomfort (1/10), denies chest pain, sob or palpitation.     ROS: All other review of systems negative, except as noted in HPI    Vital Signs Last 24 Hrs  T(C): 36.5 (07 Oct 2021 09:48), Max: 37.1 (07 Oct 2021 00:15)  T(F): 97.7 (07 Oct 2021 09:48), Max: 98.7 (07 Oct 2021 00:15)  HR: 75 (07 Oct 2021 11:00) (71 - 88)  BP: 126/64 (07 Oct 2021 11:00) (116/81 - 160/104)  BP(mean): 78 (07 Oct 2021 11:00) (78 - 93)  RR: 13 (07 Oct 2021 11:00) (10 - 94)  SpO2: 96% (07 Oct 2021 11:00) (92% - 100%)    PHYSICAL EXAM:  GENERAL: NAD, well-groomed, well-developed  HEENT - NC/AT, pupils equal and reactive to light,  ; Moist mucous membranes, Good dentition, No lesions  NECK: Supple, No JVD  CHEST/LUNG: Clear to auscultation bilaterally; No rales, rhonchi, wheezing  HEART: Regular rate and rhythm; No murmurs, rubs, or gallops  ABDOMEN: Soft, Nontender, Nondistended; Bowel sounds present  EXTREMITIES:  2+ Peripheral Pulses, No clubbing, cyanosis, or edema  NEURO:  No Focal deficits, sensory and motor intact  SKIN: No rashes or lesions, Rt radial access site intact; no hematoma or bleeding     LABS:  LABS: All Labs Reviewed:                        15.8   9.76  )-----------( 227      ( 07 Oct 2021 06:37 )             47.1     10-07    140  |  109<H>  |  15  ----------------------------<  109<H>  4.6   |  28  |  1.08    Ca    8.7      07 Oct 2021 06:37  Phos  3.5     10-07  Mg     2.3     10-07  TPro  6.4  /  Alb  3.0<L>  /  TBili  0.4  /  DBili  x   /  AST  27  /  ALT  20  /  AlkPhos  63  10-07  PT/INR - ( 07 Oct 2021 00:20 )   PT: 11.6 sec;   INR: 0.99 ratio    PTT - ( 07 Oct 2021 00:20 )  PTT:29.9 sec    RADIOLOGY/EKG:  EKG (10/7/21): post PCI- SR at 77 bpm, T wave inversion in leads aVL, V2- V5  Tele reviewed: SR, no arrhythmia noted      Cath Report < from: Cardiac Catheterization (10.07.21 @ 01:32) >  Impression  Diagnostic Conclusions   Proximal LAD 99% lesion as culprit for STEMI, resulting in SANDEEP 2 flow.   Severe disease of dominant RCA present as well as non dominant ostial Cx.   Interventional Conclusions   Successful PCI to proximal LAD resulting in SANDEEP 3 flow. Postdilation used   to optimize inflow of stent.     Recommendations   Continue DAPT for 1 year. Recommend aggressive medical therapy for CAD as   per ACC/AHA guidelines. Plan for stage PCI to RCA within 48 hours.   Findings relayed to patient, patient's wife, and CCU team.  < end of copied text >    Echo; Pending     MedsMEDICATIONS  (STANDING):  aspirin  chewable 81 milliGRAM(s) Oral daily  atorvastatin 80 milliGRAM(s) Oral at bedtime  enoxaparin Injectable 40 milliGRAM(s) SubCutaneous daily  influenza   Vaccine 0.5 milliLiter(s) IntraMuscular once  losartan 25 milliGRAM(s) Oral daily  metoprolol succinate ER 25 milliGRAM(s) Oral daily  ticagrelor 90 milliGRAM(s) Oral every 12 hours  MEDICATIONS  (PRN):  morphine  - Injectable 2 milliGRAM(s) IV Push every 5 minutes PRN Chest Pain    HPI:  60 y/o male with pmhx of prior PE on xarelto (completed anticoagulation three years ago), poor medical follow up presented with acute onset chest pain found to have DIANNE in V5, V6. Received nitro, brilinta, aspirin, heparin in ER with improvement in chest pain and taken urgently to cath lab where he had one stent placed to pLAD. DIANNE improved post cath. access obtained via right radial artery. (07 Oct 2021 01:46)    60 y/o male with history of prior PE (on rivaroxaban at that time), limited medical follow up who presented with acute onset chest pain starting prior to midnight. On further discussion, he did have pain on Saturday which lasted for a few hours and resolved on its own. In ED, he was found to have intital EKG that showed normal sinus rhythm with Q waves anteroseptally V1-V3 significant for prior infarct, at 12:17 AM. On repeat EKG at 12:35 AM, new ST elevations developed in V4, V5, V6 with reciprocal depressions, significant for STEMI. Cath lab was activated at this time. Patient received nitro, Brilinta aspirin, heparin in ER with improvement in chest pain and taken urgently to cath lab where he was found to have proximal LAD 99%, with SANDEEP 3 flow. SANDEEP 3 flow restored with 1 stent on 10/7/21. Pt admitted to CCU for close monitoring.     Plan:   - CCU monitor   - continue ASA 81 mg and Brilinta 90 mg BID  - continue Metoprolol ER 25 mg daily   - start Losartan 25 mg daily   - continue high intensity statin   - discussed CAD risk factors modification and therapeutic life style changes, including compliance of medications and follow ups, exercise, diet, weight control, smoking cessation, etc.   - NPO post MN for staged PCI of RCA tomorrow   - Echo: pending   - discussed benefits of Cardiac rehab with patient, currently pt refused.   - cath team will follow.     Discussed the plan with Dr. Mack, Dr. Ortiz, Pt, CCU RN.

## 2021-10-07 NOTE — H&P ADULT - ASSESSMENT
62 y/o male with pmhx of prior PE previously on xarelto now with:    1. acute STEMI LAD    -s/p PCI to pLAD. plan for staged intervention of RCA on friday  - asa, brilinta, high intensity statin, metoprolol. start ACE if morning Cr stable post contrast load.   - TTE pending  - check lipid profile, hgb a1c  - monitor for reperfusion arrythmias. keep K > 4, Mg > 2, phos > 3.  - DASH diet

## 2021-10-07 NOTE — ED PROVIDER NOTE - ST/T WAVE
st elevations v1 to v5, q waves v1to v3, reciprocal changes in III and Avf st elevations v1 to v3 with q waves Estimated Blood Loss (Cc): minimal

## 2021-10-07 NOTE — ED ADULT NURSE NOTE - OBJECTIVE STATEMENT
Pt presents to ED for chest pain that started around 2300 tonight, with pain radiating to left arm. Pt was given 4 baby ASA and 1 dose of nitroglycerin by EMS. Pt was feeling nauseous and sweaty at home. Pt denies cardiac history. Pt denies abdominal pain, LOC. Pt states he has a history of DVTs and PE 3 years ago, not currently on anticoagulants. Pt also states that he has been feeling numbness to his left thigh for approximately 24 hours. PMS positive.

## 2021-10-07 NOTE — ED ADULT TRIAGE NOTE - CHIEF COMPLAINT QUOTE
Pt presents to ED for chest pain starting at 2300 radiating to L arm. no known cardiac hx. ASA and 1 nitroglycerin given PTA by EMS. code STEMI activated 0012. MD Grande at bedside. Pt presents to ED for chest pain starting at 2300 radiating to L arm. no known cardiac hx. ASA and 1 nitroglycerin given PTA by EMS. ekg performed. code STEMI activated 0012. MD Grande at bedside. Pt presents to ED for chest pain starting at 2300 radiating to L arm. pt diaphoretic. no known cardiac hx. ASA and 1 nitroglycerin given PTA by EMS. ekg performed. code STEMI activated 0012. MD Grande at bedside.

## 2021-10-07 NOTE — ED PROVIDER NOTE - PROGRESS NOTE DETAILS
pt pain now 3-4/10 sp morphine., spoke with Dr. morin, Pt with continued chest pain, concern for ACS and stemi based on earlier ekg from ems, repeat ekg at 0035 shows stemi, sent to Dr. Morin, PCI called JUSTIN Grande DO pt with elevations V1 through V5 with q waves V1 to V3, st elevation V4V5 on ems ekg with active chest pain,angela santiago and spoke with  interventionalist Billie Mack, asked to send ekgs andget additional EKG. EKG  showing NSR with septal infarct q waves V1 to V3. Pt with continued chest pain, diaphoretic. requested to give brillinta and heparin. will also give morphine and zofran for pain and nauseaHe will call back JUSTIN Grande DO

## 2021-10-08 LAB
ADD ON TEST-SPECIMEN IN LAB: SIGNIFICANT CHANGE UP
ANION GAP SERPL CALC-SCNC: 7 MMOL/L — SIGNIFICANT CHANGE UP (ref 5–17)
BUN SERPL-MCNC: 14 MG/DL — SIGNIFICANT CHANGE UP (ref 7–23)
CALCIUM SERPL-MCNC: 9 MG/DL — SIGNIFICANT CHANGE UP (ref 8.5–10.1)
CHLORIDE SERPL-SCNC: 111 MMOL/L — HIGH (ref 96–108)
CO2 SERPL-SCNC: 23 MMOL/L — SIGNIFICANT CHANGE UP (ref 22–31)
CREAT SERPL-MCNC: 0.88 MG/DL — SIGNIFICANT CHANGE UP (ref 0.5–1.3)
GLUCOSE SERPL-MCNC: 96 MG/DL — SIGNIFICANT CHANGE UP (ref 70–99)
HCT VFR BLD CALC: 50.4 % — HIGH (ref 39–50)
HGB BLD-MCNC: 16.7 G/DL — SIGNIFICANT CHANGE UP (ref 13–17)
MAGNESIUM SERPL-MCNC: 2.3 MG/DL — SIGNIFICANT CHANGE UP (ref 1.6–2.6)
MCHC RBC-ENTMCNC: 31.9 PG — SIGNIFICANT CHANGE UP (ref 27–34)
MCHC RBC-ENTMCNC: 33.1 GM/DL — SIGNIFICANT CHANGE UP (ref 32–36)
MCV RBC AUTO: 96.2 FL — SIGNIFICANT CHANGE UP (ref 80–100)
PHOSPHATE SERPL-MCNC: 2.4 MG/DL — LOW (ref 2.5–4.5)
PLATELET # BLD AUTO: 240 K/UL — SIGNIFICANT CHANGE UP (ref 150–400)
POTASSIUM SERPL-MCNC: 4.1 MMOL/L — SIGNIFICANT CHANGE UP (ref 3.5–5.3)
POTASSIUM SERPL-SCNC: 4.1 MMOL/L — SIGNIFICANT CHANGE UP (ref 3.5–5.3)
RBC # BLD: 5.24 M/UL — SIGNIFICANT CHANGE UP (ref 4.2–5.8)
RBC # FLD: 14.6 % — HIGH (ref 10.3–14.5)
SODIUM SERPL-SCNC: 141 MMOL/L — SIGNIFICANT CHANGE UP (ref 135–145)
WBC # BLD: 10.81 K/UL — HIGH (ref 3.8–10.5)
WBC # FLD AUTO: 10.81 K/UL — HIGH (ref 3.8–10.5)

## 2021-10-08 PROCEDURE — 99222 1ST HOSP IP/OBS MODERATE 55: CPT

## 2021-10-08 PROCEDURE — 99233 SBSQ HOSP IP/OBS HIGH 50: CPT

## 2021-10-08 PROCEDURE — 93010 ELECTROCARDIOGRAM REPORT: CPT | Mod: 76

## 2021-10-08 RX ORDER — TICAGRELOR 90 MG/1
1 TABLET ORAL
Qty: 60 | Refills: 11
Start: 2021-10-08 | End: 2022-10-02

## 2021-10-08 RX ORDER — SODIUM CHLORIDE 9 MG/ML
1000 INJECTION INTRAMUSCULAR; INTRAVENOUS; SUBCUTANEOUS
Refills: 0 | Status: DISCONTINUED | OUTPATIENT
Start: 2021-10-08 | End: 2021-10-09

## 2021-10-08 RX ORDER — SPIRONOLACTONE 25 MG/1
12.5 TABLET, FILM COATED ORAL DAILY
Refills: 0 | Status: DISCONTINUED | OUTPATIENT
Start: 2021-10-08 | End: 2021-10-09

## 2021-10-08 RX ADMIN — Medication 25 MILLIGRAM(S): at 10:42

## 2021-10-08 RX ADMIN — SODIUM CHLORIDE 100 MILLILITER(S): 9 INJECTION INTRAMUSCULAR; INTRAVENOUS; SUBCUTANEOUS at 15:14

## 2021-10-08 RX ADMIN — ATORVASTATIN CALCIUM 80 MILLIGRAM(S): 80 TABLET, FILM COATED ORAL at 21:32

## 2021-10-08 RX ADMIN — TICAGRELOR 90 MILLIGRAM(S): 90 TABLET ORAL at 10:42

## 2021-10-08 RX ADMIN — SPIRONOLACTONE 12.5 MILLIGRAM(S): 25 TABLET, FILM COATED ORAL at 21:32

## 2021-10-08 RX ADMIN — LOSARTAN POTASSIUM 25 MILLIGRAM(S): 100 TABLET, FILM COATED ORAL at 10:42

## 2021-10-08 RX ADMIN — TICAGRELOR 90 MILLIGRAM(S): 90 TABLET ORAL at 21:32

## 2021-10-08 RX ADMIN — Medication 81 MILLIGRAM(S): at 10:42

## 2021-10-08 NOTE — CHART NOTE - NSCHARTNOTESELECT_GEN_ALL_CORE
interventional cardiology- consent
Interventional cardiology- radial band removal
Interventional cardiology/Event Note

## 2021-10-08 NOTE — CHART NOTE - NSCHARTNOTEFT_GEN_A_CORE
Pt was seen at the bedside, denies chest pain/ pressure, sob, palpitation, light handedness or dizziness, denies Rt arm pain, numbness or tingling  VSS, Rt radial access site with pressure dressing intact- no hematoma or bleeding   Discussed cardiac rehab program with pt and family, who agreed, faxed referral form.   Pt can be discharged home tomorrow if remaining stable   Team will follow up in am Pt was seen at the bedside, denies chest pain/ pressure, sob, palpitation, light handedness or dizziness, denies Rt arm pain, numbness or tingling  VSS, Rt radial access site with pressure dressing intact- no hematoma or bleeding   Discussed cardiac rehab program with pt and family, who agreed, faxed referral form.   Pt can be discharged home tomorrow if remaining stable   Team will follow up in am    Patient has follow up in Dr. Mack's office in hospital on 1st floor on 10/20/21 at 9 am.

## 2021-10-08 NOTE — CHART NOTE - NSCHARTNOTEFT_GEN_A_CORE
61 y.o male presented to ED with STEMI, s/p LAD stent on 10/7/21 brought to cath lab today for staged PCI of RCA.   Seen and examined Pt in holding.   VSS, A+O x4, denies chest pain, sob or palpitation at this time.   - Procedure risks, alternatives, benefits and potential complications were discussed in detail. Risks include but not limited to bleeding, infection, allergy, renal failure requiring dialysis, stroke, vascular injury, pericardial tamponade, arrhythmias, MI and even death.   - Informed consent obtained and Pt verbalizes and understands preprocedure instructions.      ASA class: II  Cr: 0.88  GFR: 92  Bleeding risk: 3.7%

## 2021-10-08 NOTE — CHART NOTE - NSCHARTNOTEFT_GEN_A_CORE
Rt radial band removed successfully, applied manual compression for 10 min, hemostasis obtained.   Chest pain improving, denies sob or palpitation, denies Rt arm pain, numbness or tingling.    VSS, pressure dressing in place; no hematoma or bleeding, 2+  Rt radial pulse, capillary refill < 2 sec   Cath team will follow up in am.

## 2021-10-08 NOTE — PROGRESS NOTE ADULT - SUBJECTIVE AND OBJECTIVE BOX
HPI:  60 y/o male with pmhx of prior PE on xarelto (completed anticoagulation three years ago), limited medical follow up presented with acute onset chest pain found to have DIANNE in V5, V6. Received nitro, brilinta, aspirin, heparin in ER with improvement in chest pain and taken urgently to cath lab where he had one stent placed to pLAD on 10/7/2021 and presented to cath lab today for staged PCI of RCA.     Now, Pt is s/p MERRILL x1 to mid RCA.    ROS: denies chest pain/ pressure, SOB or palpitation     Vital Signs;  T(C): 36.8 (10-08-21 @ 11:30), Max: 36.8 (10-08-21 @ 11:30)  HR: 77 (10-08-21 @ 14:28) (69 - 90)  BP: 159/94 (10-08-21 @ 14:28) (104/58 - 162/94)  RR: 18 (10-08-21 @ 14:28) (8 - 18)  SpO2: 97% (10-08-21 @ 14:28) (94% - 99%)    PHYSICAL EXAM:  GENERAL: NAD, well-groomed, well-developed  HEENT - NC/AT, pupils equal and reactive to light,  ; Moist mucous membranes, Good dentition, No lesions  NECK: Supple, No JVD  CHEST/LUNG: Clear to auscultation bilaterally; No rales, rhonchi, wheezing  HEART: Regular rate and rhythm; No murmurs, rubs, or gallops  ABDOMEN: Soft, Nontender, Nondistended; Bowel sounds present  EXTREMITIES:  2+ Peripheral Pulses, No clubbing, cyanosis, or edema  NEURO:  No Focal deficits, sensory and motor intact  SKIN: No rashes or lesions, Rt radial access site with radial band, small hematoma resolved after manual compression for 10 min. Rt radial band to be removed in 2 hrs.     LABS: All Labs Reviewed:                        16.7   10.81 )-----------( 240      ( 08 Oct 2021 07:09 )             50.4     10-08    141  |  111<H>  |  14  ----------------------------<  96  4.1   |  23  |  0.88    Ca    9.0      08 Oct 2021 07:09  Phos  2.4     10-08  Mg     2.3     10-08  TPro  6.4  /  Alb  3.0<L>  /  TBili  0.4  /  DBili  x   /  AST  27  /  ALT  20  /  AlkPhos  63  10-07  PT/INR - ( 07 Oct 2021 00:20 )   PT: 11.6 sec;   INR: 0.99 ratio    PTT - ( 07 Oct 2021 00:20 )  PTT:29.9 sec    < from: Cardiac Catheterization (10.07.21 @ 01:32) >  Impression     Diagnostic Conclusions   Proximal LAD 99% lesion as culprit for STEMI, resulting in SANDEEP 2 flow.   Severe disease of dominant RCA present as well as non dominant ostial Cx.     Interventional Conclusions     Successful PCI to proximal LAD resulting in SANDEEP 3 flow. Postdilation used   to optimize inflow of stent.     Recommendations     Continue DAPT for 1 year. Recommend aggressive medical therapy for CAD as   per ACC/AHA guidelines. Plan for stage PCI to RCA within 48 hours.   Findings relayed to patient, patient's wife, and CCU team.    Estimated Blood Loss:5ml.  No complication.  < end of copied text >    Cath report (10/8/21): pending     Medications:  aspirin  chewable 81 milliGRAM(s) Oral daily  atorvastatin 80 milliGRAM(s) Oral at bedtime  enoxaparin Injectable 40 milliGRAM(s) SubCutaneous daily  influenza   Vaccine 0.5 milliLiter(s) IntraMuscular once  losartan 25 milliGRAM(s) Oral daily  metoprolol succinate ER 25 milliGRAM(s) Oral daily  morphine  - Injectable 2 milliGRAM(s) IV Push every 5 minutes PRN  sodium chloride 0.9%. 1000 milliLiter(s) IV Continuous <Continuous>  ticagrelor 90 milliGRAM(s) Oral every 12 hours    A/P:  60 y/o male with pmhx of prior PE on xarelto (completed anticoagulation three years ago), limited medical follow up presented with acute onset chest pain found to have DIANNE in V5, V6. Received nitro, brilinta, aspirin, heparin in ER with improvement in chest pain and taken urgently to cath lab where he had one stent placed to pLAD on 10/7/2021. Today, Pt underwent successful PCI of RCA with MERRILL x1.   - CCU monitor   - IV hydration  - Labs and EKG in am   - continue ASA 81 mg daily and Brilinta 90 mg BID   - continue Losartan and Metoprolol   - continue high intensity statin   - post procedure, outcome and follow up care reviewed with patient and DR. Mack   - Discussed therapeutic lifestyle modifications to reduce CAD risk factors including cardiac diet, weight control, exercise, smoking cessation, medication compliance and regular outpt follow-up.   - possible discharge home in am if medically stable  - follow up with Dr. Mack in 1-2 weeks as an outpt     Discussed the plan with Dr. Mack, Pt and Cath RN.  HPI:  62 y/o male with pmhx of prior PE on xarelto (completed anticoagulation three years ago), limited medical follow up presented with acute onset chest pain found to have DIANNE in V5, V6. Received nitro, brilinta, aspirin, heparin in ER with improvement in chest pain and taken urgently to cath lab where he had one stent placed to pLAD on 10/7/2021 and presented to cath lab today for staged PCI of RCA.     Now, Pt is s/p MERRILL x1 to mid RCA.    ROS: denies chest pain/ pressure, SOB or palpitation     Vital Signs;  T(C): 36.8 (10-08-21 @ 11:30), Max: 36.8 (10-08-21 @ 11:30)  HR: 77 (10-08-21 @ 14:28) (69 - 90)  BP: 159/94 (10-08-21 @ 14:28) (104/58 - 162/94)  RR: 18 (10-08-21 @ 14:28) (8 - 18)  SpO2: 97% (10-08-21 @ 14:28) (94% - 99%)    PHYSICAL EXAM:  GENERAL: NAD, well-groomed, well-developed  HEENT - NC/AT, pupils equal and reactive to light,  ; Moist mucous membranes, Good dentition, No lesions  NECK: Supple, No JVD  CHEST/LUNG: Clear to auscultation bilaterally; No rales, rhonchi, wheezing  HEART: Regular rate and rhythm; No murmurs, rubs, or gallops  ABDOMEN: Soft, Nontender, Nondistended; Bowel sounds present  EXTREMITIES:  2+ Peripheral Pulses, No clubbing, cyanosis, or edema  NEURO:  No Focal deficits, sensory and motor intact  SKIN: No rashes or lesions, Rt radial access site with radial band, small hematoma resolved after manual compression for 10 min. Rt radial band to be removed in 2 hrs.     LABS: All Labs Reviewed:                        16.7   10.81 )-----------( 240      ( 08 Oct 2021 07:09 )             50.4     10-08    141  |  111<H>  |  14  ----------------------------<  96  4.1   |  23  |  0.88    Ca    9.0      08 Oct 2021 07:09  Phos  2.4     10-08  Mg     2.3     10-08  TPro  6.4  /  Alb  3.0<L>  /  TBili  0.4  /  DBili  x   /  AST  27  /  ALT  20  /  AlkPhos  63  10-07  PT/INR - ( 07 Oct 2021 00:20 )   PT: 11.6 sec;   INR: 0.99 ratio    PTT - ( 07 Oct 2021 00:20 )  PTT:29.9 sec    Post EKG (10/8/21): SR at 89 bpm, remaining T wave inversion in V1, aVL, V5-V6, which improving compared with prior in am.   < from: Cardiac Catheterization (10.07.21 @ 01:32) >  Impression     Diagnostic Conclusions   Proximal LAD 99% lesion as culprit for STEMI, resulting in SANDEEP 2 flow.   Severe disease of dominant RCA present as well as non dominant ostial Cx.     Interventional Conclusions     Successful PCI to proximal LAD resulting in SANDEEP 3 flow. Postdilation used   to optimize inflow of stent.     Recommendations     Continue DAPT for 1 year. Recommend aggressive medical therapy for CAD as   per ACC/AHA guidelines. Plan for stage PCI to RCA within 48 hours.   Findings relayed to patient, patient's wife, and CCU team.    Estimated Blood Loss:5ml.  No complication.  < end of copied text >    Cath report (10/8/21): pending     Medications:  aspirin  chewable 81 milliGRAM(s) Oral daily  atorvastatin 80 milliGRAM(s) Oral at bedtime  enoxaparin Injectable 40 milliGRAM(s) SubCutaneous daily  influenza   Vaccine 0.5 milliLiter(s) IntraMuscular once  losartan 25 milliGRAM(s) Oral daily  metoprolol succinate ER 25 milliGRAM(s) Oral daily  morphine  - Injectable 2 milliGRAM(s) IV Push every 5 minutes PRN  sodium chloride 0.9%. 1000 milliLiter(s) IV Continuous <Continuous>  ticagrelor 90 milliGRAM(s) Oral every 12 hours    A/P:  62 y/o male with pmhx of prior PE on xarelto (completed anticoagulation three years ago), limited medical follow up presented with acute onset chest pain found to have DIANNE in V5, V6. Received nitro, brilinta, aspirin, heparin in ER with improvement in chest pain and taken urgently to cath lab where he had one stent placed to pLAD on 10/7/2021. Today, Pt underwent successful PCI of RCA with MERRILL x1.   - CCU monitor   - IV hydration  - Labs and EKG in am   - continue ASA 81 mg daily and Brilinta 90 mg BID   - continue Losartan and Metoprolol   - continue high intensity statin   - post procedure, outcome and follow up care reviewed with patient and DR. Mack   - Discussed therapeutic lifestyle modifications to reduce CAD risk factors including cardiac diet, weight control, exercise, smoking cessation, medication compliance and regular outpt follow-up.   - possible discharge home in am if medically stable  - follow up with Dr. Mack in 1-2 weeks as an outpt     Discussed the plan with Dr. Mack, Pt and Cath RN.  HPI:  62 y/o male with pmhx of prior PE on xarelto (completed anticoagulation three years ago), limited medical follow up presented with acute onset chest pain found to have DIANNE in V5, V6. Received nitro, brilinta, aspirin, heparin in ER with improvement in chest pain and taken urgently to cath lab where he had one stent placed to pLAD on 10/7/2021 and presented to cath lab today for staged PCI of RCA.     Now, Pt is s/p MERRILL x1 to mid RCA.    ROS: denies chest pain/ pressure, SOB or palpitation     Vital Signs;  T(C): 36.8 (10-08-21 @ 11:30), Max: 36.8 (10-08-21 @ 11:30)  HR: 77 (10-08-21 @ 14:28) (69 - 90)  BP: 159/94 (10-08-21 @ 14:28) (104/58 - 162/94)  RR: 18 (10-08-21 @ 14:28) (8 - 18)  SpO2: 97% (10-08-21 @ 14:28) (94% - 99%)    PHYSICAL EXAM:  GENERAL: NAD, well-groomed, well-developed  HEENT - NC/AT, pupils equal and reactive to light,  ; Moist mucous membranes, Good dentition, No lesions  NECK: Supple, No JVD  CHEST/LUNG: Clear to auscultation bilaterally; No rales, rhonchi, wheezing  HEART: Regular rate and rhythm; No murmurs, rubs, or gallops  ABDOMEN: Soft, Nontender, Nondistended; Bowel sounds present  EXTREMITIES:  2+ Peripheral Pulses, No clubbing, cyanosis, or edema  NEURO:  No Focal deficits, sensory and motor intact  SKIN: No rashes or lesions, Rt radial access site with radial band, small hematoma resolved after manual compression for 10 min. Rt radial band to be removed in 2 hrs.     LABS: All Labs Reviewed:                        16.7   10.81 )-----------( 240      ( 08 Oct 2021 07:09 )             50.4     10-08    141  |  111<H>  |  14  ----------------------------<  96  4.1   |  23  |  0.88    Ca    9.0      08 Oct 2021 07:09  Phos  2.4     10-08  Mg     2.3     10-08  TPro  6.4  /  Alb  3.0<L>  /  TBili  0.4  /  DBili  x   /  AST  27  /  ALT  20  /  AlkPhos  63  10-07  PT/INR - ( 07 Oct 2021 00:20 )   PT: 11.6 sec;   INR: 0.99 ratio    PTT - ( 07 Oct 2021 00:20 )  PTT:29.9 sec    Post EKG (10/8/21): SR at 89 bpm, remaining T wave inversion in V1, aVL, V5-V6, which improving compared with prior in am.   < from: Cardiac Catheterization (10.07.21 @ 01:32) >  Impression     Diagnostic Conclusions   Proximal LAD 99% lesion as culprit for STEMI, resulting in SANDEEP 2 flow.   Severe disease of dominant RCA present as well as non dominant ostial Cx.     Interventional Conclusions     Successful PCI to proximal LAD resulting in SANDEEP 3 flow. Postdilation used   to optimize inflow of stent.     Recommendations     Continue DAPT for 1 year. Recommend aggressive medical therapy for CAD as   per ACC/AHA guidelines. Plan for stage PCI to RCA within 48 hours.   Findings relayed to patient, patient's wife, and CCU team.    Estimated Blood Loss:5ml.  No complication.  < end of copied text >    Cath report (10/8/21): pending     Medications:  aspirin  chewable 81 milliGRAM(s) Oral daily  atorvastatin 80 milliGRAM(s) Oral at bedtime  enoxaparin Injectable 40 milliGRAM(s) SubCutaneous daily  influenza   Vaccine 0.5 milliLiter(s) IntraMuscular once  losartan 25 milliGRAM(s) Oral daily  metoprolol succinate ER 25 milliGRAM(s) Oral daily  morphine  - Injectable 2 milliGRAM(s) IV Push every 5 minutes PRN  sodium chloride 0.9%. 1000 milliLiter(s) IV Continuous <Continuous>  ticagrelor 90 milliGRAM(s) Oral every 12 hours    A/P:  62 y/o male with pmhx of prior PE on xarelto (completed anticoagulation three years ago), limited medical follow up presented with acute onset chest pain found to have DIANNE in V5, V6. Received nitro, brilinta, aspirin, heparin in ER with improvement in chest pain and taken urgently to cath lab where he had one stent placed to pLAD on 10/7/2021. Today, Pt underwent successful PCI of RCA with MERRILL x1.   - CCU monitor   - IV hydration  - Labs and EKG in am   - continue ASA 81 mg daily and Brilinta 90 mg BID  - start Spironolactone 12.5 mg daily    - continue Losartan and Metoprolol   - continue high intensity statin   - post procedure, outcome and follow up care reviewed with patient and DR. Mack   - Discussed therapeutic lifestyle modifications to reduce CAD risk factors including cardiac diet, weight control, exercise, smoking cessation, medication compliance and regular outpt follow-up.   - possible discharge home in am if medically stable  - follow up with Dr. Mack in 1-2 weeks as an outpt     Discussed the plan with Dr. Mack, Pt and Cath RN.  HPI:  62 y/o male with pmhx of prior PE on xarelto (completed anticoagulation three years ago), limited medical follow up presented with acute onset chest pain found to have DIANNE in V5, V6. Received nitro, brilinta, aspirin, heparin in ER with improvement in chest pain and taken urgently to cath lab where he had one stent placed to pLAD on 10/7/2021 and presented to cath lab today for staged PCI of RCA.     Now, Pt is s/p MERRILL x1 to mid RCA.    ROS: denies chest pain/ pressure, SOB or palpitation     Vital Signs;  T(C): 36.8 (10-08-21 @ 11:30), Max: 36.8 (10-08-21 @ 11:30)  HR: 77 (10-08-21 @ 14:28) (69 - 90)  BP: 159/94 (10-08-21 @ 14:28) (104/58 - 162/94)  RR: 18 (10-08-21 @ 14:28) (8 - 18)  SpO2: 97% (10-08-21 @ 14:28) (94% - 99%)    PHYSICAL EXAM:  GENERAL: NAD, well-groomed, well-developed  HEENT - NC/AT, pupils equal and reactive to light,  ; Moist mucous membranes, Good dentition, No lesions  NECK: Supple, No JVD  CHEST/LUNG: Clear to auscultation bilaterally; No rales, rhonchi, wheezing  HEART: Regular rate and rhythm; No murmurs, rubs, or gallops  ABDOMEN: Soft, Nontender, Nondistended; Bowel sounds present  EXTREMITIES:  2+ Peripheral Pulses, No clubbing, cyanosis, or edema  NEURO:  No Focal deficits, sensory and motor intact  SKIN: No rashes or lesions, Rt radial access site with radial band, small hematoma resolved after manual compression for 10 min. Rt radial band to be removed in 2 hrs.     LABS: All Labs Reviewed:                        16.7   10.81 )-----------( 240      ( 08 Oct 2021 07:09 )             50.4     10-08    141  |  111<H>  |  14  ----------------------------<  96  4.1   |  23  |  0.88    Ca    9.0      08 Oct 2021 07:09  Phos  2.4     10-08  Mg     2.3     10-08  TPro  6.4  /  Alb  3.0<L>  /  TBili  0.4  /  DBili  x   /  AST  27  /  ALT  20  /  AlkPhos  63  10-07  PT/INR - ( 07 Oct 2021 00:20 )   PT: 11.6 sec;   INR: 0.99 ratio    PTT - ( 07 Oct 2021 00:20 )  PTT:29.9 sec    Post EKG (10/8/21): SR at 89 bpm, remaining T wave inversion in V1, aVL, V5-V6, which improving compared with prior in am.   < from: Cardiac Catheterization (10.07.21 @ 01:32) >  Impression     Diagnostic Conclusions   Proximal LAD 99% lesion as culprit for STEMI, resulting in SANDEEP 2 flow.   Severe disease of dominant RCA present as well as non dominant ostial Cx.     Interventional Conclusions     Successful PCI to proximal LAD resulting in SANDEEP 3 flow. Postdilation used   to optimize inflow of stent.     Recommendations     Continue DAPT for 1 year. Recommend aggressive medical therapy for CAD as   per ACC/AHA guidelines. Plan for stage PCI to RCA within 48 hours.   Findings relayed to patient, patient's wife, and CCU team.    Estimated Blood Loss:5ml.  No complication.  < end of copied text >    Cath report (10/8/21): pending     Medications:  aspirin  chewable 81 milliGRAM(s) Oral daily  atorvastatin 80 milliGRAM(s) Oral at bedtime  enoxaparin Injectable 40 milliGRAM(s) SubCutaneous daily  influenza   Vaccine 0.5 milliLiter(s) IntraMuscular once  losartan 25 milliGRAM(s) Oral daily  metoprolol succinate ER 25 milliGRAM(s) Oral daily  morphine  - Injectable 2 milliGRAM(s) IV Push every 5 minutes PRN  sodium chloride 0.9%. 1000 milliLiter(s) IV Continuous <Continuous>  ticagrelor 90 milliGRAM(s) Oral every 12 hours    A/P:  62 y/o male with pmhx of prior PE on xarelto (completed anticoagulation three years ago), limited medical follow up presented with acute onset chest pain found to have DIANNE in V5, V6. Received nitro, brilinta, aspirin, heparin in ER with improvement in chest pain and taken urgently to cath lab where he had one stent placed to pLAD on 10/7/2021. Today, Pt underwent successful PCI of RCA with MERRILL x1.   - CCU monitor   - IV hydration  - Labs and EKG in am   - continue ASA 81 mg daily and Brilinta 90 mg BID  - start Spironolactone 12.5 mg daily    - continue Losartan and Metoprolol   - continue high intensity statin   - post procedure, outcome and follow up care reviewed with patient and DR. Mack   - Discussed therapeutic lifestyle modifications to reduce CAD risk factors including cardiac diet, weight control, exercise, smoking cessation, medication compliance and regular outpt follow-up.   - possible discharge home in am if medically stable  - follow up with Dr. Mack on 10/20/21 at 9 am as an outpt     Discussed the plan with Dr. Mack, Pt and Cath RN.

## 2021-10-08 NOTE — PROGRESS NOTE ADULT - ASSESSMENT
ASSESSMENT AND PLAN:62 y/o male with acute onset chest pain found to have DIANNE in V5, V6. S/P PCI of LAD stent on 10/7; scheduled for staged PCI of RCA today  Plan:   # STEMI   - ASA 81 mg and Brilinta 90 mg BID  -  Metoprolol ER 25 mg daily   - Losartan 25 mg daily   - high intensity statin -Atorvastatin 80 mg PO HS   - discussed CAD risk factors modification and therapeutic life style changes, including compliance of medications and follow ups, exercise, diet, weight control, smoking cessation, etc.   -  staged PCI of RCA today  - Follow up Echo 
A/P: 61 male S/P STEMI with Stent to the Prox LAD    Plan:  CCU  EKG PND  Stage to for RCA stenting on 10/7  ASA, Brilinta  BBX  Statin  Echo today  Lovenox DVT Prophylaxis 
A/P: 61 male S/P STEMI with Stent to the Prox LAD    Plan:  CCU  Stage to for RCA stenting today  ASA, Brilinta  BBX  Statin  Echo awaiting read   Lovenox DVT Prophylaxis

## 2021-10-08 NOTE — PROGRESS NOTE ADULT - SUBJECTIVE AND OBJECTIVE BOX
CHIEF COMPLAINT/DIAGNOSIS:    HPI: 62 y/o male with history of prior PE (on rivaroxaban at that time), limited medical follow up who presented with acute onset chest pain starting prior to midnight. On further discussion, he did have pain on Saturday which lasted for a few hours and resolved on its own. In ED, he was found to have intital EKG that showed normal sinus rhythm with Q waves anteroseptally V1-V3 significant for prior infarct, at 12:17 AM. On repeat EKG at 12:35 AM, new ST elevations developed in V4, V5, V6 with reciprocal depressions, significant for STEMI. Cath lab was activated at this time. Patient received nitro, Brilinta aspirin, heparin in ER with improvement in chest pain and taken urgently to cath lab where he was found to have proximal LAD 99%, with SANEDEP 3 flow. SANDEEP 3 flow restored with 1 stent on 10/7/21.     SUBJECTIVE: No events overnight. Denies CP, SOB    REVIEW OF SYSTEMS:  All other review of systems is negative unless indicated above    PHYSICAL EXAM:  Constitutional: NAD, awake   Neck: Soft and supple, No JVD  Respiratory: Breath sounds are clear bilaterally, No wheezing, rales or rhonchi  Cardiovascular: S1 and S2, regular rate and rhythm, no Murmurs, gallops or rubs  Gastrointestinal: Bowel Sounds present, soft, nontender, nondistended, no guarding, no rebound  Extremities: No peripheral edema  Vascular: 2+ peripheral pulses  Neurological: A/O x 3, no focal deficits  Musculoskeletal: 5/5 strength b/l upper and lower extremities  Skin: No rashes  Right radial procedure site with no bleeding or hematoma    Vital Signs Last 24 Hrs  T(C): 36.6 (08 Oct 2021 06:16), Max: 36.6 (07 Oct 2021 15:40)  T(F): 97.8 (08 Oct 2021 06:16), Max: 97.8 (07 Oct 2021 15:40)  HR: 78 (08 Oct 2021 07:00) (69 - 81)  BP: 138/72 (08 Oct 2021 07:00) (104/58 - 148/85)  BP(mean): 88 (08 Oct 2021 07:00) (69 - 101)  RR: 14 (08 Oct 2021 07:00) (8 - 17)  SpO2: 96% (08 Oct 2021 07:00) (94% - 98%)    LABS: All Labs Reviewed:                        16.7   10.81 )-----------( 240      ( 08 Oct 2021 07:09 )             50.4     10-08    141  |  111<H>  |  14  ----------------------------<  96  4.1   |  23  |  0.88    Ca    9.0      08 Oct 2021 07:09  Phos  2.4     10-08  Mg     2.3     10-08    TPro  6.4  /  Alb  3.0<L>  /  TBili  0.4  /  DBili  x   /  AST  27  /  ALT  20  /  AlkPhos  63  10-07    PT/INR - ( 07 Oct 2021 00:20 )   PT: 11.6 sec;   INR: 0.99 ratio         PTT - ( 07 Oct 2021 00:20 )  PTT:29.9 sec      RADIOLOGY:        ECHOCARDIOGRAM:    MEDICATIONS  (STANDING):  aspirin  chewable 81 milliGRAM(s) Oral daily  atorvastatin 80 milliGRAM(s) Oral at bedtime  enoxaparin Injectable 40 milliGRAM(s) SubCutaneous daily  influenza   Vaccine 0.5 milliLiter(s) IntraMuscular once  losartan 25 milliGRAM(s) Oral daily  metoprolol succinate ER 25 milliGRAM(s) Oral daily  ticagrelor 90 milliGRAM(s) Oral every 12 hours    MEDICATIONS  (PRN):  morphine  - Injectable 2 milliGRAM(s) IV Push every 5 minutes PRN Chest Pain    TELEMETRY REVIEW: NSR with 1 run NSVT  EKG: NSR    ASSESSMENT AND PLAN:62 y/o male with acute onset chest pain found to have DIANNE in V5, V6. S/P PCI of LAD stent on 10/7; scheduled for staged PCI of RCA today  Plan:   # STEMI   - ASA 81 mg and Brilinta 90 mg BID  -  Metoprolol ER 25 mg daily   - start Losartan 25 mg daily   - high intensity statin   - discussed CAD risk factors modification and therapeutic life style changes, including compliance of medications and follow ups, exercise, diet, weight control, smoking cessation, etc.   -  staged PCI of RCA today  - Follow up Echo    CHIEF COMPLAINT/DIAGNOSIS:    HPI: 60 y/o male with history of prior PE (on rivaroxaban at that time), limited medical follow up who presented with acute onset chest pain starting prior to midnight. On further discussion, he did have pain on Saturday which lasted for a few hours and resolved on its own. In ED, he was found to have intital EKG that showed normal sinus rhythm with Q waves anteroseptally V1-V3 significant for prior infarct, at 12:17 AM. On repeat EKG at 12:35 AM, new ST elevations developed in V4, V5, V6 with reciprocal depressions, significant for STEMI. Cath lab was activated at this time. Patient received nitro, Brilinta aspirin, heparin in ER with improvement in chest pain and taken urgently to cath lab where he was found to have proximal LAD 99%, with SANDEEP 3 flow. SANDEEP 3 flow restored with 1 stent on 10/7/21.     SUBJECTIVE: No events overnight. Denies CP, SOB    REVIEW OF SYSTEMS:  All other review of systems is negative unless indicated above    PHYSICAL EXAM:  Constitutional: NAD, awake   Neck: Soft and supple, No JVD  Respiratory: Breath sounds are clear bilaterally, No wheezing, rales or rhonchi  Cardiovascular: S1 and S2, regular rate and rhythm, no Murmurs, gallops or rubs  Gastrointestinal: Bowel Sounds present, soft, nontender, nondistended, no guarding, no rebound  Extremities: No peripheral edema  Vascular: 2+ peripheral pulses  Neurological: A/O x 3, no focal deficits  Musculoskeletal: 5/5 strength b/l upper and lower extremities  Skin: No rashes  Right radial procedure site with no bleeding or hematoma    Vital Signs Last 24 Hrs  T(C): 36.6 (08 Oct 2021 06:16), Max: 36.6 (07 Oct 2021 15:40)  T(F): 97.8 (08 Oct 2021 06:16), Max: 97.8 (07 Oct 2021 15:40)  HR: 78 (08 Oct 2021 07:00) (69 - 81)  BP: 138/72 (08 Oct 2021 07:00) (104/58 - 148/85)  BP(mean): 88 (08 Oct 2021 07:00) (69 - 101)  RR: 14 (08 Oct 2021 07:00) (8 - 17)  SpO2: 96% (08 Oct 2021 07:00) (94% - 98%)    LABS: All Labs Reviewed:                        16.7   10.81 )-----------( 240      ( 08 Oct 2021 07:09 )             50.4     10-08    141  |  111<H>  |  14  ----------------------------<  96  4.1   |  23  |  0.88    Ca    9.0      08 Oct 2021 07:09  Phos  2.4     10-08  Mg     2.3     10-08    TPro  6.4  /  Alb  3.0<L>  /  TBili  0.4  /  DBili  x   /  AST  27  /  ALT  20  /  AlkPhos  63  10-07    PT/INR - ( 07 Oct 2021 00:20 )   PT: 11.6 sec;   INR: 0.99 ratio         PTT - ( 07 Oct 2021 00:20 )  PTT:29.9 sec      RADIOLOGY:        ECHOCARDIOGRAM:    MEDICATIONS  (STANDING):  aspirin  chewable 81 milliGRAM(s) Oral daily  atorvastatin 80 milliGRAM(s) Oral at bedtime  enoxaparin Injectable 40 milliGRAM(s) SubCutaneous daily  influenza   Vaccine 0.5 milliLiter(s) IntraMuscular once  losartan 25 milliGRAM(s) Oral daily  metoprolol succinate ER 25 milliGRAM(s) Oral daily  ticagrelor 90 milliGRAM(s) Oral every 12 hours    MEDICATIONS  (PRN):  morphine  - Injectable 2 milliGRAM(s) IV Push every 5 minutes PRN Chest Pain    TELEMETRY REVIEW: NSR with 1 run NSVT  EKG: NSR with improved DIANNE in anterior leads and TWI in lateral leads    ASSESSMENT AND PLAN:60 y/o male with acute onset chest pain found to have DIANNE in V5, V6. S/P PCI of LAD stent on 10/7; scheduled for staged PCI of RCA today  Plan:   # STEMI   - ASA 81 mg and Brilinta 90 mg BID  -  Metoprolol ER 25 mg daily   - Losartan 25 mg daily   - high intensity statin -Atorvastatin 80 mg PO HS   - discussed CAD risk factors modification and therapeutic life style changes, including compliance of medications and follow ups, exercise, diet, weight control, smoking cessation, etc.   -  staged PCI of RCA today  - Follow up Echo    CHIEF COMPLAINT/DIAGNOSIS:    HPI: 60 y/o male with history of prior PE (on rivaroxaban at that time), limited medical follow up who presented with acute onset chest pain starting prior to midnight. On further discussion, he did have pain on Saturday which lasted for a few hours and resolved on its own. In ED, he was found to have intital EKG that showed normal sinus rhythm with Q waves anteroseptally V1-V3 significant for prior infarct, at 12:17 AM. On repeat EKG at 12:35 AM, new ST elevations developed in V4, V5, V6 with reciprocal depressions, significant for STEMI. Cath lab was activated at this time. Patient received nitro, Brilinta aspirin, heparin in ER with improvement in chest pain and taken urgently to cath lab where he was found to have proximal LAD 99%, with SANDEEP 3 flow. SANDEEP 3 flow restored with 1 stent on 10/7/21.     SUBJECTIVE: No events overnight. Denies CP, SOB    REVIEW OF SYSTEMS:  All other review of systems is negative unless indicated above    PHYSICAL EXAM:  Constitutional: NAD, awake   Neck: Soft and supple, No JVD  Respiratory: Breath sounds are clear bilaterally, No wheezing, rales or rhonchi  Cardiovascular: S1 and S2, regular rate and rhythm, no Murmurs, gallops or rubs  Gastrointestinal: Bowel Sounds present, soft, nontender, nondistended, no guarding, no rebound  Extremities: No peripheral edema  Vascular: 2+ peripheral pulses  Neurological: A/O x 3, no focal deficits  Musculoskeletal: 5/5 strength b/l upper and lower extremities  Skin: No rashes  Right radial procedure site with no bleeding or hematoma    Vital Signs Last 24 Hrs  T(C): 36.6 (08 Oct 2021 06:16), Max: 36.6 (07 Oct 2021 15:40)  T(F): 97.8 (08 Oct 2021 06:16), Max: 97.8 (07 Oct 2021 15:40)  HR: 78 (08 Oct 2021 07:00) (69 - 81)  BP: 138/72 (08 Oct 2021 07:00) (104/58 - 148/85)  BP(mean): 88 (08 Oct 2021 07:00) (69 - 101)  RR: 14 (08 Oct 2021 07:00) (8 - 17)  SpO2: 96% (08 Oct 2021 07:00) (94% - 98%)    LABS: All Labs Reviewed:                        16.7   10.81 )-----------( 240      ( 08 Oct 2021 07:09 )             50.4     10-08    141  |  111<H>  |  14  ----------------------------<  96  4.1   |  23  |  0.88    Ca    9.0      08 Oct 2021 07:09  Phos  2.4     10-08  Mg     2.3     10-08    TPro  6.4  /  Alb  3.0<L>  /  TBili  0.4  /  DBili  x   /  AST  27  /  ALT  20  /  AlkPhos  63  10-07    PT/INR - ( 07 Oct 2021 00:20 )   PT: 11.6 sec;   INR: 0.99 ratio         PTT - ( 07 Oct 2021 00:20 )  PTT:29.9 sec      RADIOLOGY:        ECHOCARDIOGRAM:    MEDICATIONS  (STANDING):  aspirin  chewable 81 milliGRAM(s) Oral daily  atorvastatin 80 milliGRAM(s) Oral at bedtime  enoxaparin Injectable 40 milliGRAM(s) SubCutaneous daily  influenza   Vaccine 0.5 milliLiter(s) IntraMuscular once  losartan 25 milliGRAM(s) Oral daily  metoprolol succinate ER 25 milliGRAM(s) Oral daily  ticagrelor 90 milliGRAM(s) Oral every 12 hours    MEDICATIONS  (PRN):  morphine  - Injectable 2 milliGRAM(s) IV Push every 5 minutes PRN Chest Pain    TELEMETRY REVIEW: NSR with 1 run NSVT  EKG: NSR with improved DIANNE in anterior leads and TWI in lateral leads

## 2021-10-08 NOTE — PACU DISCHARGE NOTE - COMMENTS
Report given to Karen RAYMOND. Patient A&Ox4, speech clear. V/S/S. Access site intact, no bleeding or hematoma. Patient placed on portable monitor, and transported with RN.

## 2021-10-08 NOTE — PROGRESS NOTE ADULT - SUBJECTIVE AND OBJECTIVE BOX
HPI:  60 y/o male with pmhx of prior PE on xarelto (completed anticoagulation three years ago), poor medical follow up presented with acute onset chest pain found to have DIANNE in V5, V6. Received nitro, brilinta, aspirin, heparin in ER with improvement in chest pain and taken urgently to cath lab where he had one stent placed to pLAD. DIANNE improved post cath. access obtained via right radial artery.     10/7: Patient seen in the CCU, no chest pain of SOB, Cath site clean R Wrist  10/8: Patient for second stage today, No CP or SOB    PAST MEDICAL & SURGICAL HISTORY:  No pertinent past medical history    History of appendectomy        FAMILY HISTORY:  Family history of hypertension in mother (Mother)        Social Hx:    Allergies    No Known Allergies    Intolerances            ICU Vital Signs Last 24 Hrs  T(C): 36.3 (08 Oct 2021 08:31), Max: 36.6 (07 Oct 2021 15:40)  T(F): 97.4 (08 Oct 2021 08:31), Max: 97.8 (07 Oct 2021 15:40)  HR: 78 (08 Oct 2021 07:00) (69 - 81)  BP: 138/72 (08 Oct 2021 07:00) (104/58 - 148/85)  BP(mean): 88 (08 Oct 2021 07:00) (69 - 101)  ABP: --  ABP(mean): --  RR: 14 (08 Oct 2021 07:00) (8 - 17)  SpO2: 96% (08 Oct 2021 07:00) (94% - 98%)          I&O's Summary    07 Oct 2021 07:01  -  08 Oct 2021 07:00  --------------------------------------------------------  IN: 480 mL / OUT: 1200 mL / NET: -720 mL                              16.7   10.81 )-----------( 240      ( 08 Oct 2021 07:09 )             50.4       10-08    141  |  111<H>  |  14  ----------------------------<  96  4.1   |  23  |  0.88    Ca    9.0      08 Oct 2021 07:09  Phos  2.4     10-08  Mg     2.3     10-08    TPro  6.4  /  Alb  3.0<L>  /  TBili  0.4  /  DBili  x   /  AST  27  /  ALT  20  /  AlkPhos  63  10-07                    MEDICATIONS  (STANDING):  aspirin  chewable 81 milliGRAM(s) Oral daily  atorvastatin 80 milliGRAM(s) Oral at bedtime  enoxaparin Injectable 40 milliGRAM(s) SubCutaneous daily  influenza   Vaccine 0.5 milliLiter(s) IntraMuscular once  losartan 25 milliGRAM(s) Oral daily  metoprolol succinate ER 25 milliGRAM(s) Oral daily  ticagrelor 90 milliGRAM(s) Oral every 12 hours    MEDICATIONS  (PRN):  morphine  - Injectable 2 milliGRAM(s) IV Push every 5 minutes PRN Chest Pain      DVT Prophylaxis: Lovenox    Advanced Directives:  Discussed with:    Visit Information:    ** Time is exclusive of billed procedures and/or teaching and/or routine family updates.

## 2021-10-09 ENCOUNTER — TRANSCRIPTION ENCOUNTER (OUTPATIENT)
Age: 61
End: 2021-10-09

## 2021-10-09 VITALS
HEART RATE: 85 BPM | OXYGEN SATURATION: 95 % | DIASTOLIC BLOOD PRESSURE: 63 MMHG | RESPIRATION RATE: 14 BRPM | SYSTOLIC BLOOD PRESSURE: 132 MMHG

## 2021-10-09 LAB
ANION GAP SERPL CALC-SCNC: 8 MMOL/L — SIGNIFICANT CHANGE UP (ref 5–17)
BASOPHILS # BLD AUTO: 0.06 K/UL — SIGNIFICANT CHANGE UP (ref 0–0.2)
BASOPHILS NFR BLD AUTO: 0.6 % — SIGNIFICANT CHANGE UP (ref 0–2)
BUN SERPL-MCNC: 14 MG/DL — SIGNIFICANT CHANGE UP (ref 7–23)
CALCIUM SERPL-MCNC: 8.8 MG/DL — SIGNIFICANT CHANGE UP (ref 8.5–10.1)
CHLORIDE SERPL-SCNC: 110 MMOL/L — HIGH (ref 96–108)
CO2 SERPL-SCNC: 22 MMOL/L — SIGNIFICANT CHANGE UP (ref 22–31)
CREAT SERPL-MCNC: 0.82 MG/DL — SIGNIFICANT CHANGE UP (ref 0.5–1.3)
EOSINOPHIL # BLD AUTO: 0.25 K/UL — SIGNIFICANT CHANGE UP (ref 0–0.5)
EOSINOPHIL NFR BLD AUTO: 2.5 % — SIGNIFICANT CHANGE UP (ref 0–6)
GLUCOSE SERPL-MCNC: 90 MG/DL — SIGNIFICANT CHANGE UP (ref 70–99)
HCT VFR BLD CALC: 49.6 % — SIGNIFICANT CHANGE UP (ref 39–50)
HGB BLD-MCNC: 16.4 G/DL — SIGNIFICANT CHANGE UP (ref 13–17)
IMM GRANULOCYTES NFR BLD AUTO: 0.3 % — SIGNIFICANT CHANGE UP (ref 0–1.5)
LYMPHOCYTES # BLD AUTO: 1.71 K/UL — SIGNIFICANT CHANGE UP (ref 1–3.3)
LYMPHOCYTES # BLD AUTO: 17.3 % — SIGNIFICANT CHANGE UP (ref 13–44)
MAGNESIUM SERPL-MCNC: 2.2 MG/DL — SIGNIFICANT CHANGE UP (ref 1.6–2.6)
MCHC RBC-ENTMCNC: 31.8 PG — SIGNIFICANT CHANGE UP (ref 27–34)
MCHC RBC-ENTMCNC: 33.1 GM/DL — SIGNIFICANT CHANGE UP (ref 32–36)
MCV RBC AUTO: 96.3 FL — SIGNIFICANT CHANGE UP (ref 80–100)
MONOCYTES # BLD AUTO: 0.84 K/UL — SIGNIFICANT CHANGE UP (ref 0–0.9)
MONOCYTES NFR BLD AUTO: 8.5 % — SIGNIFICANT CHANGE UP (ref 2–14)
NEUTROPHILS # BLD AUTO: 6.97 K/UL — SIGNIFICANT CHANGE UP (ref 1.8–7.4)
NEUTROPHILS NFR BLD AUTO: 70.8 % — SIGNIFICANT CHANGE UP (ref 43–77)
PHOSPHATE SERPL-MCNC: 2.9 MG/DL — SIGNIFICANT CHANGE UP (ref 2.5–4.5)
PLATELET # BLD AUTO: 246 K/UL — SIGNIFICANT CHANGE UP (ref 150–400)
POTASSIUM SERPL-MCNC: 3.8 MMOL/L — SIGNIFICANT CHANGE UP (ref 3.5–5.3)
POTASSIUM SERPL-SCNC: 3.8 MMOL/L — SIGNIFICANT CHANGE UP (ref 3.5–5.3)
RBC # BLD: 5.15 M/UL — SIGNIFICANT CHANGE UP (ref 4.2–5.8)
RBC # FLD: 14.3 % — SIGNIFICANT CHANGE UP (ref 10.3–14.5)
SODIUM SERPL-SCNC: 140 MMOL/L — SIGNIFICANT CHANGE UP (ref 135–145)
WBC # BLD: 9.86 K/UL — SIGNIFICANT CHANGE UP (ref 3.8–10.5)
WBC # FLD AUTO: 9.86 K/UL — SIGNIFICANT CHANGE UP (ref 3.8–10.5)

## 2021-10-09 PROCEDURE — 99238 HOSP IP/OBS DSCHRG MGMT 30/<: CPT

## 2021-10-09 PROCEDURE — 99239 HOSP IP/OBS DSCHRG MGMT >30: CPT

## 2021-10-09 PROCEDURE — 93010 ELECTROCARDIOGRAM REPORT: CPT

## 2021-10-09 RX ORDER — METOPROLOL TARTRATE 50 MG
1 TABLET ORAL
Qty: 30 | Refills: 3
Start: 2021-10-09 | End: 2022-02-05

## 2021-10-09 RX ORDER — ATORVASTATIN CALCIUM 80 MG/1
1 TABLET, FILM COATED ORAL
Qty: 30 | Refills: 3
Start: 2021-10-09 | End: 2022-02-05

## 2021-10-09 RX ORDER — POTASSIUM CHLORIDE 20 MEQ
40 PACKET (EA) ORAL ONCE
Refills: 0 | Status: COMPLETED | OUTPATIENT
Start: 2021-10-09 | End: 2021-10-09

## 2021-10-09 RX ORDER — ASPIRIN/CALCIUM CARB/MAGNESIUM 324 MG
1 TABLET ORAL
Qty: 90 | Refills: 3
Start: 2021-10-09 | End: 2022-10-03

## 2021-10-09 RX ORDER — LOSARTAN POTASSIUM 100 MG/1
1 TABLET, FILM COATED ORAL
Qty: 30 | Refills: 3
Start: 2021-10-09 | End: 2022-02-05

## 2021-10-09 RX ORDER — SPIRONOLACTONE 25 MG/1
0.5 TABLET, FILM COATED ORAL
Qty: 15 | Refills: 3
Start: 2021-10-09 | End: 2022-02-05

## 2021-10-09 RX ORDER — TICAGRELOR 90 MG/1
1 TABLET ORAL
Qty: 180 | Refills: 3
Start: 2021-10-09 | End: 2022-10-03

## 2021-10-09 RX ADMIN — TICAGRELOR 90 MILLIGRAM(S): 90 TABLET ORAL at 11:24

## 2021-10-09 RX ADMIN — LOSARTAN POTASSIUM 25 MILLIGRAM(S): 100 TABLET, FILM COATED ORAL at 11:24

## 2021-10-09 RX ADMIN — Medication 81 MILLIGRAM(S): at 11:22

## 2021-10-09 RX ADMIN — Medication 25 MILLIGRAM(S): at 11:24

## 2021-10-09 RX ADMIN — SPIRONOLACTONE 12.5 MILLIGRAM(S): 25 TABLET, FILM COATED ORAL at 11:24

## 2021-10-09 RX ADMIN — Medication 40 MILLIEQUIVALENT(S): at 11:24

## 2021-10-09 NOTE — DISCHARGE NOTE PROVIDER - NSDCCPTREATMENT_GEN_ALL_CORE_FT
PRINCIPAL PROCEDURE  Procedure: Left heart catheterization  Findings and Treatment: Staged PCI of RCA 10/08, LAD stent 10/07 via RRA

## 2021-10-09 NOTE — DISCHARGE NOTE PROVIDER - NSDCMRMEDTOKEN_GEN_ALL_CORE_FT
acetaminophen 325 mg oral tablet: 2 tab(s) orally every 6 hours, As needed, Mild Pain   rivaroxaban 15 mg oral tablet: 1 tab(s) orally every 12 hours  ticagrelor 90 mg oral tablet: 1 tab(s) orally every 12 hours  traMADol 50 mg oral tablet: 1 tab(s) orally every 8 hours, As needed, Moderate Pain (4 - 6) MDD:3 tabs   acetaminophen 325 mg oral tablet: 2 tab(s) orally every 6 hours, As needed, Mild Pain   aspirin 81 mg oral tablet, chewable: 1 tab(s) orally once a day  atorvastatin 80 mg oral tablet: 1 tab(s) orally once a day (at bedtime)  losartan 25 mg oral tablet: 1 tab(s) orally once a day  metoprolol succinate 25 mg oral tablet, extended release: 1 tab(s) orally once a day  spironolactone 25 mg oral tablet: 0.5 tab(s) orally once a day  ticagrelor 90 mg oral tablet: 1 tab(s) orally every 12 hours  traMADol 50 mg oral tablet: 1 tab(s) orally every 8 hours, As needed, Moderate Pain (4 - 6) MDD:3 tabs

## 2021-10-09 NOTE — DISCHARGE NOTE PROVIDER - CARE PROVIDER_API CALL
Aris Mack)  Cardiology; Internal Medicine; Interventional Cardiology  [address], [address]  [place], [place] [id number]  Phone: [phone]  Fax: [phone]  Follow Up Time:

## 2021-10-09 NOTE — PROGRESS NOTE ADULT - ATTENDING COMMENTS
Agree with above. Continue DAPT and goal directed medical therapy for heart failure.
Agree with above. Plan for stage to RCA given severe vulnerable plaque post STEMI. Plan to add spironolactone to regimen today given reduced ejection fraction on echo.

## 2021-10-09 NOTE — PROGRESS NOTE ADULT - SUBJECTIVE AND OBJECTIVE BOX
Cardiology Nurse Practitioner Progress note:   s/p STEMI, s/p PCI/ MERRILL to LAD 10/07, now s/p staged PCI of RCA on 10/08 via RRA by Dr Mack  observed overnight on CCU-no overnight events    Patient feels well.  Denies chest pain, shortness of breath, dizziness or palpitations at this time.        EKG:NSR@80,  anteroseptal infarct changes, lateral ischemia  TELE:no ectopy, NSR 70-80s  GENERAL: appears well, OOB to chair/ commode  CV: RRR, S1 S2, no murmur, rub, clicks or gallop noted  RESP: LCTA bilaterally, no wheeze, no rhonchi or crackles noted  GI/: soft, NT/ND  NEURO: AOx4, no focal deficits  EXTREMITIES: no edema, clubbing or cyanosis noted  PROCEDURE SITE:  Right radial  dressing removed, site CDI with no bleeding, no hematoma, patient able to move all digits with capillary refill < 3 seconds, fingers warm  Education r/t post procedure radial access provided and discussed    T(C): 35.6 (10-09-21 @ 04:00), Max: 36.9 (10-08-21 @ 19:45)  HR: 77 (10-09-21 @ 09:00) (73 - 91)  BP: 122/76 (10-09-21 @ 09:00) (122/76 - 164/94)  RR: 19 (10-09-21 @ 09:00) (11 - 20)  SpO2: 95% (10-09-21 @ 09:00) (94% - 99%)  Wt(kg): --  CBC Full  -  ( 09 Oct 2021 07:09 )  WBC Count : 9.86 K/uL  RBC Count : 5.15 M/uL  Hemoglobin : 16.4 g/dL  Hematocrit : 49.6 %  Platelet Count - Automated : 246 K/uL  Mean Cell Volume : 96.3 fl  Mean Cell Hemoglobin : 31.8 pg  Mean Cell Hemoglobin Concentration : 33.1 gm/dL  Auto Neutrophil # : 6.97 K/uL  Auto Lymphocyte # : 1.71 K/uL  Auto Monocyte # : 0.84 K/uL  Auto Eosinophil # : 0.25 K/uL  Auto Basophil # : 0.06 K/uL  Auto Neutrophil % : 70.8 %  Auto Lymphocyte % : 17.3 %  Auto Monocyte % : 8.5 %  Auto Eosinophil % : 2.5 %  Auto Basophil % : 0.6 %    10-09    140  |  110<H>  |  14  ----------------------------<  90  3.8   |  22  |  0.82    Ca    8.8      09 Oct 2021 07:09  Phos  2.9     10-09  Mg     2.2     10-09              MEDICATIONS  (STANDING):  aspirin  chewable 81 milliGRAM(s) Oral daily  atorvastatin 80 milliGRAM(s) Oral at bedtime  enoxaparin Injectable 40 milliGRAM(s) SubCutaneous daily  influenza   Vaccine 0.5 milliLiter(s) IntraMuscular once  losartan 25 milliGRAM(s) Oral daily  metoprolol succinate ER 25 milliGRAM(s) Oral daily  potassium chloride    Tablet ER 40 milliEquivalent(s) Oral once  sodium chloride 0.9%. 1000 milliLiter(s) (100 mL/Hr) IV Continuous <Continuous>  spironolactone 12.5 milliGRAM(s) Oral daily  ticagrelor 90 milliGRAM(s) Oral every 12 hours    MEDICATIONS  (PRN):  morphine  - Injectable 2 milliGRAM(s) IV Push every 5 minutes PRN Chest Pain        HPI:  60 y/o male with pmhx of prior PE on xarelto (completed anticoagulation three years ago), poor medical follow up presented with acute onset chest pain found to have DIANNE in V5, V6. Received nitro, brilinta, aspirin, heparin in ER with improvement in chest pain and taken urgently to cath lab where he had one stent placed to pLAD. DIANNE improved post cath. access obtained via right radial artery. (07 Oct 2021 01:46)    s/p STEMI, s/p PCI/ MERRILL to LAD 10/07, now s/p staged PCI of RCA on 10/08 via RRA by Dr Reyes for discharge    ASSESSMENT/PLAN:    Coronary artery disease  -RRA precautions/ education prrovided & discussed withg pt  -Continue Baby ASA and Brilinta -importance of DAPT reinforced with pt  -Discussed tobacco cessation importance and resources via [facility]-pt refusing to accept any nicotine gum or patch RX  -OOB to chair, then increase mobility as tolerated  -labs, EKG and procedure site assessed  -DIscharge home this am  -continue aspirin, brilinta, BB, ARB, statin and spironolactone as ordered-all meds reviewed with pt  -Plan of care discussed with patient, & intensivist Aung Ortiz  -Potassium supplementation x1 dose prior to discharge given  -Follow-up with Dr Mack on October 20th as outpt in office-appt given    -Discussed therapeutic lifestyle changes to reduce risk factors such as following a cardiac diet, weight loss, maintaining a healthy weight, exercise, smoking cessation, medication compliance, and regular follow-up  with MD to know your numbers (BP, cholesterol, weight, and glucose)  cardiac rehab info provided/referral and communication to cardiac rehab completed   Cardiology Nurse Practitioner Progress note:   s/p STEMI, s/p PCI/ MERRILL to LAD 10/07, now s/p staged PCI of RCA on 10/08 via RRA by Dr Mack  observed overnight on CCU-no overnight events    Patient feels well.  Denies chest pain, shortness of breath, dizziness or palpitations at this time.        EKG:NSR@80,  anteroseptal infarct changes, lateral ischemia  TELE:no ectopy, NSR 70-80s  GENERAL: appears well, OOB to chair/ commode  CV: RRR, S1 S2, no murmur, rub, clicks or gallop noted  RESP: LCTA bilaterally, no wheeze, no rhonchi or crackles noted  GI/: soft, NT/ND  NEURO: AOx4, no focal deficits  EXTREMITIES: no edema, clubbing or cyanosis noted  PROCEDURE SITE:  Right radial  dressing removed, site CDI with no bleeding, no hematoma, patient able to move all digits with capillary refill < 3 seconds, fingers warm  Education r/t post procedure radial access provided and discussed    T(C): 35.6 (10-09-21 @ 04:00), Max: 36.9 (10-08-21 @ 19:45)  HR: 77 (10-09-21 @ 09:00) (73 - 91)  BP: 122/76 (10-09-21 @ 09:00) (122/76 - 164/94)  RR: 19 (10-09-21 @ 09:00) (11 - 20)  SpO2: 95% (10-09-21 @ 09:00) (94% - 99%)  Wt(kg): --  CBC Full  -  ( 09 Oct 2021 07:09 )  WBC Count : 9.86 K/uL  RBC Count : 5.15 M/uL  Hemoglobin : 16.4 g/dL  Hematocrit : 49.6 %  Platelet Count - Automated : 246 K/uL  Mean Cell Volume : 96.3 fl  Mean Cell Hemoglobin : 31.8 pg  Mean Cell Hemoglobin Concentration : 33.1 gm/dL  Auto Neutrophil # : 6.97 K/uL  Auto Lymphocyte # : 1.71 K/uL  Auto Monocyte # : 0.84 K/uL  Auto Eosinophil # : 0.25 K/uL  Auto Basophil # : 0.06 K/uL  Auto Neutrophil % : 70.8 %  Auto Lymphocyte % : 17.3 %  Auto Monocyte % : 8.5 %  Auto Eosinophil % : 2.5 %  Auto Basophil % : 0.6 %    10-09    140  |  110<H>  |  14  ----------------------------<  90  3.8   |  22  |  0.82    Ca    8.8      09 Oct 2021 07:09  Phos  2.9     10-09  Mg     2.2     10-09              MEDICATIONS  (STANDING):  aspirin  chewable 81 milliGRAM(s) Oral daily  atorvastatin 80 milliGRAM(s) Oral at bedtime  enoxaparin Injectable 40 milliGRAM(s) SubCutaneous daily  influenza   Vaccine 0.5 milliLiter(s) IntraMuscular once  losartan 25 milliGRAM(s) Oral daily  metoprolol succinate ER 25 milliGRAM(s) Oral daily  potassium chloride    Tablet ER 40 milliEquivalent(s) Oral once  sodium chloride 0.9%. 1000 milliLiter(s) (100 mL/Hr) IV Continuous <Continuous>  spironolactone 12.5 milliGRAM(s) Oral daily  ticagrelor 90 milliGRAM(s) Oral every 12 hours    MEDICATIONS  (PRN):  morphine  - Injectable 2 milliGRAM(s) IV Push every 5 minutes PRN Chest Pain        HPI:  62 y/o male with pmhx of prior PE on xarelto (completed anticoagulation three years ago), poor medical follow up presented with acute onset chest pain found to have DIANNE in V5, V6. Received nitro, brilinta, aspirin, heparin in ER with improvement in chest pain and taken urgently to cath lab where he had one stent placed to pLAD. DIANNE improved post cath. access obtained via right radial artery. (07 Oct 2021 01:46)    s/p STEMI, s/p PCI/ MERRILL to LAD 10/07, now s/p staged PCI of RCA on 10/08 via RRA by Dr Mack-angel for discharge    ASSESSMENT/PLAN:    Coronary artery disease  -RRA precautions/ education provided & discussed with pt  -Continue Baby ASA and Brilinta -importance of DAPT reinforced with pt  -Discussed tobacco cessation importance and resources via [facility]-pt refusing to accept any nicotine gum or patch RX  -OOB to chair, then increase mobility as tolerated  -labs, EKG and procedure site assessed  -DIscharge home this am  -continue aspirin, brilinta, BB, ARB, statin and spironolactone as ordered-all meds reviewed with pt  -Plan of care discussed with patient, & intensivist Aung Ortiz  -Potassium supplementation x1 dose prior to discharge given  -Follow-up with Dr Mack on October 20th as outpt in office-appt given    -Discussed therapeutic lifestyle changes to reduce risk factors such as following a cardiac diet, weight loss, maintaining a healthy weight, exercise, smoking cessation, medication compliance, and regular follow-up  with MD to know your numbers (BP, cholesterol, weight, and glucose)  cardiac rehab info provided/referral and communication to cardiac rehab completed

## 2021-10-09 NOTE — DISCHARGE NOTE PROVIDER - HOSPITAL COURSE
s/p STEMI, s/p PCI/ MERRILL to LAD 10/07, now s/p staged PCI of RCA on 10/08 via RRA by Dr Mack-stable for discharge    ASSESSMENT/PLAN:    Coronary artery disease  -RRA precautions/ education prrovided & discussed withg pt  -Continue Baby ASA and Brilinta -importance of DAPT reinforced with pt  -Discussed tobacco cessation importance and resources via [facility]-pt refusing to accept any nicotine gum or patch RX  -OOB to chair, then increase mobility as tolerated  -labs, EKG and procedure site assessed  -DIscharge home this am  -continue aspirin, brilinta, BB, ARB, statin and spironolactone as ordered-all meds reviewed with pt  -Plan of care discussed with patient, & intensivist Aung Ortiz  -Potassium supplementation x1 dose prior to discharge given  -Follow-up with Dr Mack on October 20th as outpt in office-appt given    -Discussed therapeutic lifestyle changes to reduce risk factors such as following a cardiac diet, weight loss, maintaining a healthy weight, exercise, smoking cessation, medication compliance, and regular follow-up  with MD to know your numbers (BP, cholesterol, weight, and glucose)  cardiac rehab info provided/referral and communication to cardiac rehab completed     s/p STEMI, s/p PCI/ MERRILL to LAD 10/07, now s/p staged PCI of RCA on 10/08 via RRA by Dr Mack-stable for discharge    ASSESSMENT/PLAN:    Coronary artery disease  -RRA precautions/ education prrovided & discussed withg pt  -Continue Baby ASA and Brilinta -importance of DAPT reinforced with pt  -Discussed tobacco cessation importance and resources via [facility] provided-pt refusing to accept any nicotine gum or patch RX-states he will reconsider in future   -OOB to chair, then increase mobility as tolerated  -labs, EKG and procedure site assessed  -DIscharge home this am  -continue aspirin, brilinta, BB, ARB, statin and spironolactone as ordered-all meds reviewed with pt  -Plan of care discussed with patient, & intensivist Aung Ortiz  -Potassium supplementation x1 dose prior to discharge given  -Follow-up with Dr Mack on October 20th as outpt in office-appt given    -Discussed therapeutic lifestyle changes to reduce risk factors such as following a cardiac diet, weight loss, maintaining a healthy weight, exercise, smoking cessation, medication compliance, and regular follow-up  with MD to know your numbers (BP, cholesterol, weight, and glucose)  cardiac rehab info provided/referral and communication to cardiac rehab completed

## 2021-10-09 NOTE — DISCHARGE NOTE NURSING/CASE MANAGEMENT/SOCIAL WORK - PATIENT PORTAL LINK FT
You can access the FollowMyHealth Patient Portal offered by [facility] by registering at the following website: [email or web address]. By joining [employer]’s FollowMyHealth portal, you will also be able to view your health information using other applications (apps) compatible with our system.

## 2021-10-09 NOTE — DISCHARGE NOTE PROVIDER - NSDCACTIVITY_GEN_ALL_CORE
Return to Work/School allowed/Do not drive or operate machinery/Showering allowed/Stairs allowed/Walking - Indoors allowed/No heavy lifting/straining/Walking - Outdoors allowed

## 2021-10-14 DIAGNOSIS — Z86.711 PERSONAL HISTORY OF PULMONARY EMBOLISM: ICD-10-CM

## 2021-10-14 DIAGNOSIS — Z82.49 FAMILY HISTORY OF ISCHEMIC HEART DISEASE AND OTHER DISEASES OF THE CIRCULATORY SYSTEM: ICD-10-CM

## 2021-10-14 DIAGNOSIS — I10 ESSENTIAL (PRIMARY) HYPERTENSION: ICD-10-CM

## 2021-10-14 DIAGNOSIS — I21.02 ST ELEVATION (STEMI) MYOCARDIAL INFARCTION INVOLVING LEFT ANTERIOR DESCENDING CORONARY ARTERY: ICD-10-CM

## 2021-10-14 DIAGNOSIS — I25.10 ATHEROSCLEROTIC HEART DISEASE OF NATIVE CORONARY ARTERY WITHOUT ANGINA PECTORIS: ICD-10-CM

## 2021-10-14 DIAGNOSIS — I47.2 VENTRICULAR TACHYCARDIA: ICD-10-CM

## 2021-10-14 DIAGNOSIS — E78.5 HYPERLIPIDEMIA, UNSPECIFIED: ICD-10-CM

## 2021-10-20 ENCOUNTER — APPOINTMENT (OUTPATIENT)
Dept: CARDIOLOGY | Facility: CLINIC | Age: 61
End: 2021-10-20

## 2021-10-20 ENCOUNTER — NON-APPOINTMENT (OUTPATIENT)
Age: 61
End: 2021-10-20

## 2021-10-20 VITALS
BODY MASS INDEX: 26.22 KG/M2 | HEIGHT: 68 IN | SYSTOLIC BLOOD PRESSURE: 146 MMHG | WEIGHT: 173 LBS | OXYGEN SATURATION: 98 % | DIASTOLIC BLOOD PRESSURE: 88 MMHG | HEART RATE: 75 BPM

## 2021-10-20 RX ORDER — RIVAROXABAN 20 MG/1
20 TABLET, FILM COATED ORAL
Qty: 30 | Refills: 6 | Status: DISCONTINUED | COMMUNITY
Start: 2018-05-03 | End: 2021-10-20

## 2021-10-29 NOTE — DISCUSSION/SUMMARY
[Congestive Heart Failure] : congestive heart failure [Hyperlipidemia] : hyperlipidemia [Risks] : risks [Benefits] : benefits [Minutes Spent: ___] : for [unfilled] ~Uminutes [FreeTextEntry2] : 30 [FreeTextEntry1] : 61 year old man with recent STEMI of proximal LAD s/p revascularization 10/7/2021 with stage to RCA who presents today for first time since hospitalization.\par \par CAD s/p STEMI with reduced EF\par No further chest pain. Euvolemic on exam today.  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 \par -c/w aspirin 81 mg and ticagrelor 90 mg bid \par -increased losartan to 50 mg\par -increased spironolactone to 25 mg daily\par -plan for up titration of metoprolol next week \par -c/w atorvastatin 80 mg daily\par -nicotine patch ordered \par -labs 5 days post medication up titration\par \par Follow up in 2 weeks in the office.

## 2021-10-29 NOTE — REASON FOR VISIT
[Symptom and Test Evaluation] : symptom and test evaluation [FreeTextEntry1] : Shiva Weinstein is a 61 year old man with recent STEMI of proximal LAD s/p revascularization 10/7/2021 with stage to RCA who presents today for first time post hospitalization. He overall feels well and reports no further chest pain since his revascularization. EF was found to be 45% at that time. He was started on goal directed medical therapy. \par \par metoprolol 25 mg \par losartan 25 mg \par ticagrelor 90 mg bid\par aspirin \par atorvastatin\par \par A1c 5.4\par

## 2021-10-29 NOTE — PHYSICAL EXAM
[Well Developed] : well developed [Normal Conjunctiva] : normal conjunctiva [Normal Venous Pressure] : normal venous pressure [No Carotid Bruit] : no carotid bruit [Normal S1, S2] : normal S1, S2 [No Murmur] : no murmur [Clear Lung Fields] : clear lung fields [Good Air Entry] : good air entry [Soft] : abdomen soft [Non Tender] : non-tender [No Edema] : no edema [No Rash] : no rash [Alert and Oriented] : alert and oriented [Normal memory] : normal memory

## 2021-10-29 NOTE — HISTORY OF PRESENT ILLNESS
[FreeTextEntry1] : TTE 10/8/021:\par \par  Fibrocalcific changes noted to the mitral valve leaflets with preserved\par  leaflet excursion.\par  EA reversal of the mitral inflow consistent with reduced compliance of the\par  left ventricle. The IVC appears normal.\par  Trace mitral regurgitation is present.\par  The aortic valve is well visualized, appears normal.\par  Normal appearing left atrium.\par \par  The left ventricle is normal in size. Mild concentric left ventricular\par  hypertrophy is present.\par  Estimated left ventricular ejection fraction is 45-50% calculated by\par  Perez's and visual estimate.\par  Apical cap and apical anterior wall are akinetic. Mid anteroseptal is\par  hypokinetic. Rest of segments wall motion is preserved.\par  Normal appearing right ventricle structure and function\par \par  Signature\par \par \par Cath 10/8/2021:\par  Impression\par \par  Interventional Conclusions\par \par  Successful MERRILL placement to mid RCA resulting in SANDEEP 3. No EKG changes at\par  end of procedure with resolving chest pain.\par \par  Recommendations\par \par  Continue DAPT for 1 year. Recommend aggressive medical therapy as per\par  ACC/AHA guidelines. Findings relayed to patient and patient's family.\par  Patient to follow up closely in the office.\par \par Estimated Blood Loss:4ml.\par \par Complications:\par No complication.\par \par \par 10/7/2021 Cath:\par  Impression\par \par  Diagnostic Conclusions\par  Proximal LAD 99% lesion as culprit for STEMI, resulting in SANDEEP 2 flow.\par  Severe disease of dominant RCA present as well as non dominant ostial Cx.\par \par  Interventional Conclusions\par \par  Successful PCI to proximal LAD resulting in SANDEEP 3 flow. Postdilation used\par  to optimize inflow of stent.\par \par  Recommendations\par \par  Continue DAPT for 1 year. Recommend aggressive medical therapy for CAD as\par  per ACC/AHA guidelines. Plan for stage PCI to RCA within 48 hours.\par  Findings relayed to patient, patient's wife, and CCU team.\par

## 2021-11-03 ENCOUNTER — APPOINTMENT (OUTPATIENT)
Dept: CARDIOLOGY | Facility: CLINIC | Age: 61
End: 2021-11-03
Payer: COMMERCIAL

## 2021-11-03 ENCOUNTER — NON-APPOINTMENT (OUTPATIENT)
Age: 61
End: 2021-11-03

## 2021-11-03 VITALS
BODY MASS INDEX: 24.77 KG/M2 | HEIGHT: 70 IN | RESPIRATION RATE: 16 BRPM | OXYGEN SATURATION: 95 % | HEART RATE: 77 BPM | SYSTOLIC BLOOD PRESSURE: 137 MMHG | WEIGHT: 173 LBS | DIASTOLIC BLOOD PRESSURE: 82 MMHG

## 2021-11-03 PROCEDURE — 99215 OFFICE O/P EST HI 40 MIN: CPT

## 2021-11-03 PROCEDURE — 93000 ELECTROCARDIOGRAM COMPLETE: CPT

## 2021-11-08 NOTE — PHYSICAL EXAM
[Well Developed] : well developed [Normal Conjunctiva] : normal conjunctiva [No Carotid Bruit] : no carotid bruit [Normal S1, S2] : normal S1, S2 [No Murmur] : no murmur [Clear Lung Fields] : clear lung fields [Good Air Entry] : good air entry [Soft] : abdomen soft [Non Tender] : non-tender [Normal Gait] : normal gait [No Edema] : no edema [No Cyanosis] : no cyanosis [No Rash] : no rash [Moves all extremities] : moves all extremities [No Focal Deficits] : no focal deficits [Normal Speech] : normal speech [Alert and Oriented] : alert and oriented

## 2021-11-08 NOTE — REASON FOR VISIT
[Coronary Artery Disease] : coronary artery disease [FreeTextEntry1] : Shiva Weinstein is a 61 year old man with recent STEMI of proximal LAD s/p revascularization 10/7/2021 with stage to RCA who presents today for follow up.  He overall feels well and reports no further chest pain since his revascularization. EF was found to be 45% at that time. He was started on goal directed medical therapy. Today he feels well and denies any chest pain, sob, etc. He is now ambulating more and engaging in mild exertion. \par \par metoprolol 25 mg \par losartan 25 mg \par ticagrelor 90 mg bid\par aspirin \par atorvastatin\par \par A1c 5.4\par  \par

## 2021-11-08 NOTE — HISTORY OF PRESENT ILLNESS
[FreeTextEntry1] : TTE 10/8/021:\par \par  Fibrocalcific changes noted to the mitral valve leaflets with preserved\par  leaflet excursion.\par  EA reversal of the mitral inflow consistent with reduced compliance of the\par  left ventricle. The IVC appears normal.\par  Trace mitral regurgitation is present.\par  The aortic valve is well visualized, appears normal.\par  Normal appearing left atrium.\par \par  The left ventricle is normal in size. Mild concentric left ventricular\par  hypertrophy is present.\par  Estimated left ventricular ejection fraction is 45-50% calculated by\par  Perez's and visual estimate.\par  Apical cap and apical anterior wall are akinetic. Mid anteroseptal is\par  hypokinetic. Rest of segments wall motion is preserved.\par  Normal appearing right ventricle structure and function\par \par  Signature\par \par \par Cath 10/8/2021:\par  Impression\par \par  Interventional Conclusions\par \par  Successful MERRILL placement to mid RCA resulting in SANDEEP 3. No EKG changes at\par  end of procedure with resolving chest pain.\par \par  Recommendations\par \par  Continue DAPT for 1 year. Recommend aggressive medical therapy as per\par  ACC/AHA guidelines. Findings relayed to patient and patient's family.\par  Patient to follow up closely in the office.\par \par Estimated Blood Loss:4ml.\par \par Complications:\par No complication.\par \par \par 10/7/2021 Cath:\par  Impression\par \par  Diagnostic Conclusions\par  Proximal LAD 99% lesion as culprit for STEMI, resulting in SANDEEP 2 flow.\par  Severe disease of dominant RCA present as well as non dominant ostial Cx.\par \par  Interventional Conclusions\par \par  Successful PCI to proximal LAD resulting in SANDEEP 3 flow. Postdilation used\par  to optimize inflow of stent.\par \par  Recommendations\par \par  Continue DAPT for 1 year. Recommend aggressive medical therapy for CAD as\par  per ACC/AHA guidelines. Plan for stage PCI to RCA within 48 hours.\par  Findings relayed to patient, patient's wife, and CCU team.\par \par

## 2021-11-08 NOTE — DISCUSSION/SUMMARY
[FreeTextEntry1] : 61 year old man with recent STEMI of proximal LAD s/p revascularization 10/7/2021 with stage to RCA who presents today for first time since hospitalization.\par \par CAD s/p STEMI with reduced EF\par No further chest pain. Euvolemic on exam today. 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 Labs reviewed with CAROLINA Pineda WNL. \par -c/w aspirin 81 mg and ticagrelor 90 mg bid \par -c/w losartan to 50 mg\par -c/w spironolactone to 25 mg daily\par -increase metoprolol to 50 mg XL today \par -c/w atorvastatin 80 mg daily\par -nicotine patch to be continued\par -plan for eventual exercise stress test to evaluate exertion capability especially given job ambitions and unrevascularized territory. \par \par Follow up in 1 month. \par

## 2021-11-29 ENCOUNTER — RX RENEWAL (OUTPATIENT)
Age: 61
End: 2021-11-29

## 2021-12-15 ENCOUNTER — NON-APPOINTMENT (OUTPATIENT)
Age: 61
End: 2021-12-15

## 2021-12-15 ENCOUNTER — APPOINTMENT (OUTPATIENT)
Dept: CARDIOLOGY | Facility: CLINIC | Age: 61
End: 2021-12-15
Payer: COMMERCIAL

## 2021-12-15 VITALS
BODY MASS INDEX: 25.05 KG/M2 | SYSTOLIC BLOOD PRESSURE: 102 MMHG | HEIGHT: 70 IN | WEIGHT: 175 LBS | RESPIRATION RATE: 16 BRPM | DIASTOLIC BLOOD PRESSURE: 65 MMHG | OXYGEN SATURATION: 100 % | HEART RATE: 79 BPM

## 2021-12-15 DIAGNOSIS — I42.0 ISCHEMIC CARDIOMYOPATHY: ICD-10-CM

## 2021-12-15 DIAGNOSIS — I25.5 ISCHEMIC CARDIOMYOPATHY: ICD-10-CM

## 2021-12-15 PROCEDURE — 99214 OFFICE O/P EST MOD 30 MIN: CPT

## 2021-12-15 PROCEDURE — 93000 ELECTROCARDIOGRAM COMPLETE: CPT

## 2021-12-15 NOTE — REASON FOR VISIT
[FreeTextEntry1] : Shiva Weinstein is a 61 year old man with recent STEMI of proximal LAD s/p revascularization 10/7/2021 with stage to RCA who presents today for follow up. He overall feels well and reports no further chest pain since his revascularization. EF was found to be 45% at that time. He was started on goal directed medical therapy. Today he feels well and denies any chest pain, sob, etc. He is now ambulating more and engaging in mild exertion. Recently took a trip to [place], [place] to help his brother move, and had no limiting issues. \par States he will start looking into cardiac rehab as well as starting his nicotine patch which he was going to do when he returned. Admits to still smoking at the current time. \par \par metoprolol 50 mg \par losartan 50 mg \par ticagrelor 90 mg bid\par aspirin \par atorvastatin\par spironolactone 25 mg\par \par A1c 5.4\par  \par

## 2021-12-15 NOTE — HISTORY OF PRESENT ILLNESS
[FreeTextEntry1] : TTE 10/8/021:\par \par  Fibrocalcific changes noted to the mitral valve leaflets with preserved\par  leaflet excursion.\par  EA reversal of the mitral inflow consistent with reduced compliance of the\par  left ventricle. The IVC appears normal.\par  Trace mitral regurgitation is present.\par  The aortic valve is well visualized, appears normal.\par  Normal appearing left atrium.\par \par  The left ventricle is normal in size. Mild concentric left ventricular\par  hypertrophy is present.\par  Estimated left ventricular ejection fraction is 45-50% calculated by\par  Perez's and visual estimate.\par  Apical cap and apical anterior wall are akinetic. Mid anteroseptal is\par  hypokinetic. Rest of segments wall motion is preserved.\par  Normal appearing right ventricle structure and function\par \par  Signature\par \par Cath 10/8/2021:\par  Impression\par  Interventional Conclusions\par  Successful MERRILL placement to mid RCA resulting in SANDEEP 3. No EKG changes at\par  end of procedure with resolving chest pain.\par  Recommendations\par  Continue DAPT for 1 year. Recommend aggressive medical therapy as per\par  ACC/AHA guidelines. Findings relayed to patient and patient's family.\par  Patient to follow up closely in the office.\par Estimated Blood Loss:4ml.\par Complications:\par No complication.\par \par 10/7/2021 Cath:\par  Impression\par  Diagnostic Conclusions\par  Proximal LAD 99% lesion as culprit for STEMI, resulting in SANDEEP 2 flow.\par  Severe disease of dominant RCA present as well as non dominant ostial Cx.\par  Interventional Conclusions\par  Successful PCI to proximal LAD resulting in SANDEEP 3 flow. Postdilation used\par  to optimize inflow of stent.\par  Recommendations\par  Continue DAPT for 1 year. Recommend aggressive medical therapy for CAD as\par  per ACC/AHA guidelines. Plan for stage PCI to RCA within 48 hours.\par  Findings relayed to patient, patient's wife, and CCU team.\par

## 2021-12-15 NOTE — DISCUSSION/SUMMARY
[FreeTextEntry1] : 61 year old man with recent STEMI of proximal LAD s/p revascularization 10/7/2021 with stage to RCA who presents today for first time since hospitalization.\par \par CAD s/p STEMI with reduced EF\par No further chest pain. Euvolemic on exam today. 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 Labs reviewed with Edwin K WNL. \par -c/w aspirin 81 mg and ticagrelor 90 mg bid \par -c/w losartan to 50 mg\par -c/w spironolactone to 25 mg daily\par -c/w metoprolol to 50 mg XL \par -c/w atorvastatin 80 mg daily\par -nicotine patch to be continued\par -cardiac rehab to begin\par -repeat TTE to evaluate EF \par \par Follow up in 2 months.\par

## 2022-01-13 ENCOUNTER — OUTPATIENT (OUTPATIENT)
Dept: OUTPATIENT SERVICES | Facility: HOSPITAL | Age: 62
LOS: 1 days | End: 2022-01-13
Payer: COMMERCIAL

## 2022-01-13 DIAGNOSIS — I21.3 ST ELEVATION (STEMI) MYOCARDIAL INFARCTION OF UNSPECIFIED SITE: ICD-10-CM

## 2022-01-13 DIAGNOSIS — Z90.49 ACQUIRED ABSENCE OF OTHER SPECIFIED PARTS OF DIGESTIVE TRACT: Chronic | ICD-10-CM

## 2022-01-13 PROCEDURE — 93306 TTE W/DOPPLER COMPLETE: CPT | Mod: 26

## 2022-01-13 PROCEDURE — 93306 TTE W/DOPPLER COMPLETE: CPT

## 2022-01-14 DIAGNOSIS — I21.3 ST ELEVATION (STEMI) MYOCARDIAL INFARCTION OF UNSPECIFIED SITE: ICD-10-CM

## 2022-01-19 ENCOUNTER — APPOINTMENT (OUTPATIENT)
Dept: CARDIOLOGY | Facility: CLINIC | Age: 62
End: 2022-01-19
Payer: COMMERCIAL

## 2022-01-19 VITALS
SYSTOLIC BLOOD PRESSURE: 123 MMHG | HEART RATE: 91 BPM | OXYGEN SATURATION: 94 % | WEIGHT: 175 LBS | DIASTOLIC BLOOD PRESSURE: 78 MMHG | HEIGHT: 70 IN | BODY MASS INDEX: 25.05 KG/M2

## 2022-01-19 PROCEDURE — 99214 OFFICE O/P EST MOD 30 MIN: CPT

## 2022-01-19 PROCEDURE — 93000 ELECTROCARDIOGRAM COMPLETE: CPT

## 2022-01-19 RX ORDER — KRILL/OM-3/DHA/EPA/PHOSPHO/AST 1000-230MG
81 CAPSULE ORAL
Qty: 90 | Refills: 2 | Status: ACTIVE | COMMUNITY
Start: 2021-10-20 | End: 1900-01-01

## 2022-01-20 NOTE — HISTORY OF PRESENT ILLNESS
[FreeTextEntry1] : TTE 1/2022:\par  Estimated left ventricular ejection fraction is 60-65 %.\par  The left ventricle is normal in size, wall thickness, wall motion and\par  contractility as seen in limited views.\par  Normal appearing left atrium.\par  Normal appearing right atrium.\par  Lipomatous atrial septum is not ruled out.\par  The aortic valve is trileaflet with thin pliable leaflets.\par  The mitral valve leaflets appear thin and normal.\par  Trace to Mild mitral regurgitation is present.\par  Normal appearing tricuspid valve structure.\par  Trace to Mild tricuspid valve regurgitation is present.\par  Pulmonic valve not well seen.\par  Trace pulmonic valvular regurgitation is present.\par \par  TTE 10/8/021:\par \par  Fibrocalcific changes noted to the mitral valve leaflets with preserved\par  leaflet excursion.\par  EA reversal of the mitral inflow consistent with reduced compliance of the\par  left ventricle. The IVC appears normal.\par  Trace mitral regurgitation is present.\par  The aortic valve is well visualized, appears normal.\par  Normal appearing left atrium.\par \par  The left ventricle is normal in size. Mild concentric left ventricular\par  hypertrophy is present.\par  Estimated left ventricular ejection fraction is 45-50% calculated by\par  Perez's and visual estimate.\par  Apical cap and apical anterior wall are akinetic. Mid anteroseptal is\par  hypokinetic. Rest of segments wall motion is preserved.\par  Normal appearing right ventricle structure and function\par \par  Signature\par \par Cath 10/8/2021:\par  Impression\par  Interventional Conclusions\par  Successful MERRILL placement to mid RCA resulting in SANDEEP 3. No EKG changes at\par  end of procedure with resolving chest pain.\par  Recommendations\par  Continue DAPT for 1 year. Recommend aggressive medical therapy as per\par  ACC/AHA guidelines. Findings relayed to patient and patient's family.\par  Patient to follow up closely in the office.\par Estimated Blood Loss:4ml.\par Complications:\par No complication.\par \par 10/7/2021 Cath:\par  Impression\par  Diagnostic Conclusions\par  Proximal LAD 99% lesion as culprit for STEMI, resulting in SANDEEP 2 flow.\par  Severe disease of dominant RCA present as well as non dominant ostial Cx.\par  Interventional Conclusions\par  Successful PCI to proximal LAD resulting in SANDEEP 3 flow. Postdilation used\par  to optimize inflow of stent.\par  Recommendations\par  Continue DAPT for 1 year. Recommend aggressive medical therapy for CAD as\par  per ACC/AHA guidelines. Plan for stage PCI to RCA within 48 hours.\par  Findings relayed to patient, patient's wife, and CCU team.\par

## 2022-01-20 NOTE — REASON FOR VISIT
[FreeTextEntry1] : Shiva Weinstein is a 61 year old man with recent STEMI of proximal LAD s/p revascularization 10/7/2021 with stage to RCA who presents today for follow up. He overall feels well and reports no further chest pain since his revascularization. EF was found to be 45% at that time, now improved to 60-65.. He was started on goal directed medical therapy. Today he feels well and denies any chest pain, sob, etc. He is now ambulating more and engaging in mild exertion. Recently took a trip to [place], [place] to help his brother move, and had no limiting issues. \par States he will start looking into cardiac rehab. Started nicotine patch but he still smokes on it, states he may need a stronger patch. Admits to still smoking at the current time. \par \par metoprolol 50 mg \par losartan 50 mg \par ticagrelor 90 mg bid\par aspirin \par atorvastatin\par spironolactone 25 mg\par \par A1c 5.4\par  \par

## 2022-01-20 NOTE — DISCUSSION/SUMMARY
[FreeTextEntry1] : 61 year old man with recent STEMI of proximal LAD s/p revascularization 10/7/2021 with stage to RCA who presents today for first time since hospitalization.\par \par CAD s/p STEMI with reduced EF\par No further chest pain. Euvolemic on exam today. 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 EF now normalized. Labs reviewed with Cr, K WNL. \par -c/w aspirin 81 mg and ticagrelor 90 mg bid \par -c/w losartan to 50 mg\par -c/w spironolactone to 25 mg daily\par -c/w metoprolol to 50 mg XL \par -c/w atorvastatin 80 mg daily\par -nicotine patch to be continued, increased dose, educated to not smoke on it\par -cardiac rehab to begin\par -repeat TTE to evaluate EF \par \par Follow up in 3 months, with labs at that time. \par

## 2022-04-06 ENCOUNTER — APPOINTMENT (OUTPATIENT)
Dept: CARDIOLOGY | Facility: CLINIC | Age: 62
End: 2022-04-06
Payer: COMMERCIAL

## 2022-04-06 VITALS
RESPIRATION RATE: 16 BRPM | WEIGHT: 175 LBS | OXYGEN SATURATION: 95 % | HEART RATE: 76 BPM | SYSTOLIC BLOOD PRESSURE: 98 MMHG | DIASTOLIC BLOOD PRESSURE: 66 MMHG | BODY MASS INDEX: 25.05 KG/M2 | HEIGHT: 70 IN

## 2022-04-06 PROCEDURE — 99214 OFFICE O/P EST MOD 30 MIN: CPT

## 2022-04-06 PROCEDURE — 93000 ELECTROCARDIOGRAM COMPLETE: CPT

## 2022-04-06 PROCEDURE — 99406 BEHAV CHNG SMOKING 3-10 MIN: CPT

## 2022-04-23 ENCOUNTER — NON-APPOINTMENT (OUTPATIENT)
Age: 62
End: 2022-04-23

## 2022-04-23 NOTE — DISCUSSION/SUMMARY
[FreeTextEntry1] : 61 year old man with recent STEMI of proximal LAD s/p revascularization 10/7/2021 with stage to RCA who presents today for follow up. \par \par CAD s/p STEMI with reduced EF\par No further chest pain. Euvolemic on exam today. 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 EF now normalized. Labs reviewed with CAROLINA Pineda WNL. BP repeated in office with 110/70. \par -c/w aspirin 81 mg and ticagrelor 90 mg bid \par -c/w losartan to 50 mg\par -c/w spironolactone to 25 mg daily\par -c/w metoprolol to 50 mg XL \par -c/w atorvastatin 80 mg daily\par -nicotine patch to be continued, increased dose, educated to not smoke on it\par -cardiac rehab to begin\par \par Follow up in 3 months, with labs at that time.

## 2022-04-23 NOTE — REASON FOR VISIT
[Coronary Artery Disease] : coronary artery disease [FreeTextEntry1] : \par Shiva Weinstein is a 61 year old man with recent STEMI of proximal LAD s/p revascularization 10/7/2021 with stage to RCA who presents today for follow up. He overall feels well and reports no further chest pain since his revascularization. EF was found to be 45% at that time, now improved to 60-65.. He was started on goal directed medical therapy. Today he feels well and denies any chest pain, sob, etc. He is now ambulating more and engaging in mild exertion. Recently took a trip to [place], [place] to help his brother move, and had no limiting issues. \par States he will start looking into cardiac rehab. Started nicotine patch but he still smokes on it, states he may need a stronger patch. Admits to still smoking at the current time. \par \par metoprolol 50 mg \par losartan 50 mg \par ticagrelor 90 mg bid\par aspirin \par atorvastatin\par spironolactone 25 mg\par \par A1c 5.4\par

## 2022-10-11 ENCOUNTER — APPOINTMENT (OUTPATIENT)
Dept: CARDIOLOGY | Facility: CLINIC | Age: 62
End: 2022-10-11

## 2022-10-11 VITALS
HEIGHT: 70 IN | DIASTOLIC BLOOD PRESSURE: 69 MMHG | BODY MASS INDEX: 25.05 KG/M2 | WEIGHT: 175 LBS | HEART RATE: 72 BPM | OXYGEN SATURATION: 97 % | SYSTOLIC BLOOD PRESSURE: 126 MMHG

## 2022-10-11 PROCEDURE — 99215 OFFICE O/P EST HI 40 MIN: CPT | Mod: 25

## 2022-10-11 PROCEDURE — 93000 ELECTROCARDIOGRAM COMPLETE: CPT

## 2022-10-11 NOTE — HISTORY OF PRESENT ILLNESS
[FreeTextEntry1] : Shiva Weinstein is a 61 year old man with recent STEMI of proximal LAD s/p revascularization 10/7/2021 with stage to RCA who presents today for follow up. He overall feels well and reports no further chest pain since his revascularization. EF was found to be 45% at that time, now improved to 60-65.. He was started on goal directed medical therapy. Today he feels well and denies any chest pain, sob, etc. He is now ambulating more and engaging in mild exertion. Recently took a trip to [place], [place] to help his brother move, and had no limiting issues. Multiple cruises over the weekend. \par \par Started nicotine patch but he still smokes on it, states he may need a stronger patch. Admits to still smoking at the current time. \par \par \par A1c 5.4--> 5.6 \par  --> 96 (3/2022)

## 2022-10-11 NOTE — CARDIOLOGY SUMMARY
[de-identified] : TTE 1/2022:\par  Estimated left ventricular ejection fraction is 60-65 %.\par  The left ventricle is normal in size, wall thickness, wall motion and\par  contractility as seen in limited views.\par  Normal appearing left atrium.\par  Normal appearing right atrium.\par  Lipomatous atrial septum is not ruled out.\par  The aortic valve is trileaflet with thin pliable leaflets.\par  The mitral valve leaflets appear thin and normal.\par  Trace to Mild mitral regurgitation is present.\par  Normal appearing tricuspid valve structure.\par  Trace to Mild tricuspid valve regurgitation is present.\par  Pulmonic valve not well seen.\par  Trace pulmonic valvular regurgitation is present.\par \par  TTE 10/8/021:\par \par  Fibrocalcific changes noted to the mitral valve leaflets with preserved\par  leaflet excursion.\par  EA reversal of the mitral inflow consistent with reduced compliance of the\par  left ventricle. The IVC appears normal.\par  Trace mitral regurgitation is present.\par  The aortic valve is well visualized, appears normal.\par  Normal appearing left atrium.\par \par  The left ventricle is normal in size. Mild concentric left ventricular\par  hypertrophy is present.\par  Estimated left ventricular ejection fraction is 45-50% calculated by\par  Perez's and visual estimate.\par  Apical cap and apical anterior wall are akinetic. Mid anteroseptal is\par  hypokinetic. Rest of segments wall motion is preserved.\par  Normal appearing right ventricle structure and function\par \par  Signature [de-identified] : \par Cath 10/8/2021:\par  Impression\par  Interventional Conclusions\par  Successful MERRILL placement to mid RCA resulting in SANDEEP 3. No EKG changes at\par  end of procedure with resolving chest pain.\par  Recommendations\par  Continue DAPT for 1 year. Recommend aggressive medical therapy as per\par  ACC/AHA guidelines. Findings relayed to patient and patient's family.\par  Patient to follow up closely in the office.\par Estimated Blood Loss:4ml.\par Complications:\par No complication.\par \par 10/7/2021 Cath:\par  Impression\par  Diagnostic Conclusions\par  Proximal LAD 99% lesion as culprit for STEMI, resulting in SANDEEP 2 flow.\par  Severe disease of dominant RCA present as well as non dominant ostial Cx.\par  Interventional Conclusions\par  Successful PCI to proximal LAD resulting in SANDEEP 3 flow. Postdilation used\par  to optimize inflow of stent.\par  Recommendations\par  Continue DAPT for 1 year. Recommend aggressive medical therapy for CAD as\par  per ACC/AHA guidelines. Plan for stage PCI to RCA within 48 hours.\par  Findings relayed to patient, patient's wife, and CCU team.\par

## 2022-10-11 NOTE — DISCUSSION/SUMMARY
[FreeTextEntry1] : 61 year old man with recent STEMI of proximal LAD s/p revascularization 10/7/2021 with stage to RCA who presents today for follow up. \par \par CAD s/p STEMI with reduced EF\par No further chest pain. Euvolemic on exam today. 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 EF now normalized. Labs reviewed with CAROL[facility] Pineda WNL. BP repeated in office with 110/70. \par -c/w aspirin 81 mg\par -stop ticagrelor 90 mg bid given more than 1 year out \par -c/w losartan to 50 mg\par -c/w spironolactone to 25 mg daily\par -c/w metoprolol to 50 mg XL \par -c/w atorvastatin 80 mg daily\par -nicotine patch to be continued, increased dose, educated to not smoke on it\par \par Follow up in 3 months, with labs at that time.  [EKG obtained to assist in diagnosis and management of assessed problem(s)] : EKG obtained to assist in diagnosis and management of assessed problem(s)

## 2022-10-13 ENCOUNTER — RX RENEWAL (OUTPATIENT)
Age: 62
End: 2022-10-13

## 2023-03-08 ENCOUNTER — APPOINTMENT (OUTPATIENT)
Dept: CARDIOLOGY | Facility: CLINIC | Age: 63
End: 2023-03-08
Payer: COMMERCIAL

## 2023-03-08 ENCOUNTER — NON-APPOINTMENT (OUTPATIENT)
Age: 63
End: 2023-03-08

## 2023-03-08 VITALS
SYSTOLIC BLOOD PRESSURE: 119 MMHG | WEIGHT: 171 LBS | DIASTOLIC BLOOD PRESSURE: 76 MMHG | BODY MASS INDEX: 24.48 KG/M2 | OXYGEN SATURATION: 95 % | HEART RATE: 76 BPM | HEIGHT: 70 IN

## 2023-03-08 PROCEDURE — 99215 OFFICE O/P EST HI 40 MIN: CPT | Mod: 25

## 2023-03-08 PROCEDURE — 93000 ELECTROCARDIOGRAM COMPLETE: CPT

## 2023-03-08 RX ORDER — TICAGRELOR 90 MG/1
90 TABLET ORAL
Qty: 180 | Refills: 2 | Status: DISCONTINUED | COMMUNITY
Start: 2021-10-20 | End: 2023-03-08

## 2023-03-08 NOTE — DISCUSSION/SUMMARY
[FreeTextEntry1] : 61 year old man with recent STEMI of proximal LAD s/p revascularization 10/7/2021 with stage to RCA who presents today for follow up. \par \par CAD s/p STEMI with reduced EF\par No further chest pain. Euvolemic on exam today. 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 EF now normalized. Labs reviewed with Edwin K WNL. BP repeated in office with 110/70. \par -c/w aspirin 81 mg\par -stop ticagrelor 90 mg bid given more than 1 year out \par -c/w losartan to 50 mg\par -c/w spironolactone to 25 mg daily\par -c/w metoprolol to 50 mg XL \par -c/w atorvastatin 80 mg daily\par -nicotine patch to be continued, increased dose, educated to not smoke on it\par \par Follow up in 6 months, with labs 3 months prior to that [EKG obtained to assist in diagnosis and management of assessed problem(s)] : EKG obtained to assist in diagnosis and management of assessed problem(s)

## 2023-03-08 NOTE — HISTORY OF PRESENT ILLNESS
[FreeTextEntry1] : Shiva Weinstein is a 61 year old man with recent STEMI of proximal LAD s/p revascularization 10/7/2021 with stage to RCA who presents today for follow up. He overall feels well and reports no further chest pain since his revascularization. EF was found to be 45% at that time, now improved to 60-65.. He was started on goal directed medical therapy. Today he feels well and denies any chest pain, sob, etc. He is now ambulating more and engaging in mild exertion. Recently took a trip to [place], [place] to help his brother move, and had no limiting issues. Multiple cruises over the weekend. \par \par Started nicotine patch but he still smokes on it, states he may need a stronger patch. Admits to still smoking at the current time. \par \par \par A1c 5.4--> 5.6 --> 5.8\par  --> 96 (3/2022)--> 70 (2023)

## 2023-03-08 NOTE — CARDIOLOGY SUMMARY
[de-identified] : TTE 1/2022:\par  Estimated left ventricular ejection fraction is 60-65 %.\par  The left ventricle is normal in size, wall thickness, wall motion and\par  contractility as seen in limited views.\par  Normal appearing left atrium.\par  Normal appearing right atrium.\par  Lipomatous atrial septum is not ruled out.\par  The aortic valve is trileaflet with thin pliable leaflets.\par  The mitral valve leaflets appear thin and normal.\par  Trace to Mild mitral regurgitation is present.\par  Normal appearing tricuspid valve structure.\par  Trace to Mild tricuspid valve regurgitation is present.\par  Pulmonic valve not well seen.\par  Trace pulmonic valvular regurgitation is present.\par \par  TTE 10/8/021:\par \par  Fibrocalcific changes noted to the mitral valve leaflets with preserved\par  leaflet excursion.\par  EA reversal of the mitral inflow consistent with reduced compliance of the\par  left ventricle. The IVC appears normal.\par  Trace mitral regurgitation is present.\par  The aortic valve is well visualized, appears normal.\par  Normal appearing left atrium.\par \par  The left ventricle is normal in size. Mild concentric left ventricular\par  hypertrophy is present.\par  Estimated left ventricular ejection fraction is 45-50% calculated by\par  Perez's and visual estimate.\par  Apical cap and apical anterior wall are akinetic. Mid anteroseptal is\par  hypokinetic. Rest of segments wall motion is preserved.\par  Normal appearing right ventricle structure and function\par \par  Signature [de-identified] : \par Cath 10/8/2021:\par  Impression\par  Interventional Conclusions\par  Successful MERRILL placement to mid RCA resulting in SANDEEP 3. No EKG changes at\par  end of procedure with resolving chest pain.\par  Recommendations\par  Continue DAPT for 1 year. Recommend aggressive medical therapy as per\par  ACC/AHA guidelines. Findings relayed to patient and patient's family.\par  Patient to follow up closely in the office.\par Estimated Blood Loss:4ml.\par Complications:\par No complication.\par \par 10/7/2021 Cath:\par  Impression\par  Diagnostic Conclusions\par  Proximal LAD 99% lesion as culprit for STEMI, resulting in SANDEEP 2 flow.\par  Severe disease of dominant RCA present as well as non dominant ostial Cx.\par  Interventional Conclusions\par  Successful PCI to proximal LAD resulting in SANDEEP 3 flow. Postdilation used\par  to optimize inflow of stent.\par  Recommendations\par  Continue DAPT for 1 year. Recommend aggressive medical therapy for CAD as\par  per ACC/AHA guidelines. Plan for stage PCI to RCA within 48 hours.\par  Findings relayed to patient, patient's wife, and CCU team.\par

## 2023-03-08 NOTE — COUNSELING
[Cessation strategies including cessation program discussed] : Cessation strategies including cessation program discussed

## 2023-07-30 ENCOUNTER — NON-APPOINTMENT (OUTPATIENT)
Age: 63
End: 2023-07-30

## 2023-08-11 ENCOUNTER — RX RENEWAL (OUTPATIENT)
Age: 63
End: 2023-08-11

## 2023-09-06 ENCOUNTER — APPOINTMENT (OUTPATIENT)
Dept: CARDIOLOGY | Facility: CLINIC | Age: 63
End: 2023-09-06
Payer: COMMERCIAL

## 2023-09-06 VITALS
WEIGHT: 176 LBS | OXYGEN SATURATION: 96 % | DIASTOLIC BLOOD PRESSURE: 78 MMHG | HEART RATE: 72 BPM | SYSTOLIC BLOOD PRESSURE: 128 MMHG | HEIGHT: 70 IN | BODY MASS INDEX: 25.2 KG/M2

## 2023-09-06 DIAGNOSIS — E78.5 HYPERLIPIDEMIA, UNSPECIFIED: ICD-10-CM

## 2023-09-06 PROCEDURE — 93000 ELECTROCARDIOGRAM COMPLETE: CPT

## 2023-09-06 PROCEDURE — 99215 OFFICE O/P EST HI 40 MIN: CPT | Mod: 25

## 2023-09-06 RX ORDER — NICOTINE 21 MG/24HR
21 PATCH, TRANSDERMAL 24 HOURS TRANSDERMAL DAILY
Qty: 30 | Refills: 1 | Status: DISCONTINUED | COMMUNITY
Start: 2021-10-20 | End: 2023-09-06

## 2023-09-06 RX ADMIN — EVOLOCUMAB 0 MG/ML: 140 INJECTION, SOLUTION SUBCUTANEOUS at 00:00

## 2023-09-06 NOTE — HISTORY OF PRESENT ILLNESS
[FreeTextEntry1] : Shiva Weinstein is a 61 year old man with recent STEMI of proximal LAD s/p revascularization 10/7/2021 with stage to RCA who presents today for follow up. He overall feels well and reports no further chest pain since his revascularization. EF was found to be 45% at that time, now improved to 60-65.. He was started on goal directed medical therapy. Today he feels well and denies any chest pain, sob, etc. He is now ambulating more and engaging in mild exertion. Recently took a trip to [place], [place] to help his brother move, and had no limiting issues. Multiple cruises over the weekend.   Started nicotine patch but he still smokes on it, states he may need a stronger patch.  doesn't wear anymore that much Admits to still smoking at the current time. denies resources to quit   A1c 5.4--> 5.6 --> 5.8  --> 96 (3/2022)--> 70 (2023)  Aug 2023 - LDL 90- triglycerides- 94; HDL- 51; total cholesterol- 158 A1C 5.4

## 2023-09-06 NOTE — REASON FOR VISIT
[Coronary Artery Disease] : coronary artery disease [FreeTextEntry1] : follow up   c/o RLE heaviness with exertion he believes leg heaviness is related to losartan.  denies CP or SOB was in [facility] this summer on a cruise able to walk and tour

## 2023-09-06 NOTE — DISCUSSION/SUMMARY
[FreeTextEntry1] : 61 year old man with recent STEMI of proximal LAD s/p revascularization 10/7/2021 with stage to RCA who presents today for follow up.   CAD s/p STEMI with reduced EF No further chest pain. Euvolemic on exam today.  EF now normalized. Labs reviewed with Cr K WNL.  -c/w aspirin 81 mg -off ticagrelor 90 mg bid given more than 1 year out  -c/w losartan to 50 mg -c/w spironolactone to 25 mg daily -c/w metoprolol to 50 mg XL  -c/w atorvastatin 80 mg daily -nicotine patch to be continued, educated to not smoke on it -LDL up to 90- educated on lifestyle changes - repatha 140mg inj- if insurances covers will prescribe   PAD sx - check B/L LE arterial duplex - JAE PVR Follow up in 3 months,

## 2023-09-06 NOTE — CARDIOLOGY SUMMARY
[de-identified] : TTE 1/2022:\par   Estimated left ventricular ejection fraction is 60-65 %.\par   The left ventricle is normal in size, wall thickness, wall motion and\par   contractility as seen in limited views.\par   Normal appearing left atrium.\par   Normal appearing right atrium.\par   Lipomatous atrial septum is not ruled out.\par   The aortic valve is trileaflet with thin pliable leaflets.\par   The mitral valve leaflets appear thin and normal.\par   Trace to Mild mitral regurgitation is present.\par   Normal appearing tricuspid valve structure.\par   Trace to Mild tricuspid valve regurgitation is present.\par   Pulmonic valve not well seen.\par   Trace pulmonic valvular regurgitation is present.\par  \par   TTE 10/8/021:\par  \par   Fibrocalcific changes noted to the mitral valve leaflets with preserved\par   leaflet excursion.\par   EA reversal of the mitral inflow consistent with reduced compliance of the\par   left ventricle. The IVC appears normal.\par   Trace mitral regurgitation is present.\par   The aortic valve is well visualized, appears normal.\par   Normal appearing left atrium.\par  \par   The left ventricle is normal in size. Mild concentric left ventricular\par   hypertrophy is present.\par   Estimated left ventricular ejection fraction is 45-50% calculated by\par   Perez's and visual estimate.\par   Apical cap and apical anterior wall are akinetic. Mid anteroseptal is\par   hypokinetic. Rest of segments wall motion is preserved.\par   Normal appearing right ventricle structure and function\par  \par   Signature [de-identified] : \par  Cath 10/8/2021:\par   Impression\par   Interventional Conclusions\par   Successful MERRILL placement to mid RCA resulting in SANDEEP 3. No EKG changes at\par   end of procedure with resolving chest pain.\par   Recommendations\par   Continue DAPT for 1 year. Recommend aggressive medical therapy as per\par   ACC/AHA guidelines. Findings relayed to patient and patient's family.\par   Patient to follow up closely in the office.\par  Estimated Blood Loss:4ml.\par  Complications:\par  No complication.\par  \par  10/7/2021 Cath:\par   Impression\par   Diagnostic Conclusions\par   Proximal LAD 99% lesion as culprit for STEMI, resulting in SANDEEP 2 flow.\par   Severe disease of dominant RCA present as well as non dominant ostial Cx.\par   Interventional Conclusions\par   Successful PCI to proximal LAD resulting in SANDEEP 3 flow. Postdilation used\par   to optimize inflow of stent.\par   Recommendations\par   Continue DAPT for 1 year. Recommend aggressive medical therapy for CAD as\par   per ACC/AHA guidelines. Plan for stage PCI to RCA within 48 hours.\par   Findings relayed to patient, patient's wife, and CCU team.\par

## 2023-10-30 ENCOUNTER — RX RENEWAL (OUTPATIENT)
Age: 63
End: 2023-10-30

## 2023-10-30 RX ORDER — LOSARTAN POTASSIUM 50 MG/1
50 TABLET, FILM COATED ORAL DAILY
Qty: 90 | Refills: 3 | Status: ACTIVE | COMMUNITY
Start: 2021-10-20 | End: 1900-01-01

## 2023-12-04 ENCOUNTER — LABORATORY RESULT (OUTPATIENT)
Age: 63
End: 2023-12-04

## 2023-12-06 ENCOUNTER — APPOINTMENT (OUTPATIENT)
Dept: CARDIOLOGY | Facility: CLINIC | Age: 63
End: 2023-12-06
Payer: COMMERCIAL

## 2023-12-06 ENCOUNTER — NON-APPOINTMENT (OUTPATIENT)
Age: 63
End: 2023-12-06

## 2023-12-06 VITALS
DIASTOLIC BLOOD PRESSURE: 73 MMHG | OXYGEN SATURATION: 95 % | BODY MASS INDEX: 25.05 KG/M2 | HEIGHT: 70 IN | HEART RATE: 66 BPM | RESPIRATION RATE: 16 BRPM | WEIGHT: 175 LBS | SYSTOLIC BLOOD PRESSURE: 115 MMHG

## 2023-12-06 PROCEDURE — 93000 ELECTROCARDIOGRAM COMPLETE: CPT

## 2023-12-06 PROCEDURE — 99215 OFFICE O/P EST HI 40 MIN: CPT | Mod: 25

## 2023-12-13 NOTE — CARDIOLOGY SUMMARY
[de-identified] : TTE 1/2022:\par   Estimated left ventricular ejection fraction is 60-65 %.\par   The left ventricle is normal in size, wall thickness, wall motion and\par   contractility as seen in limited views.\par   Normal appearing left atrium.\par   Normal appearing right atrium.\par   Lipomatous atrial septum is not ruled out.\par   The aortic valve is trileaflet with thin pliable leaflets.\par   The mitral valve leaflets appear thin and normal.\par   Trace to Mild mitral regurgitation is present.\par   Normal appearing tricuspid valve structure.\par   Trace to Mild tricuspid valve regurgitation is present.\par   Pulmonic valve not well seen.\par   Trace pulmonic valvular regurgitation is present.\par  \par   TTE 10/8/021:\par  \par   Fibrocalcific changes noted to the mitral valve leaflets with preserved\par   leaflet excursion.\par   EA reversal of the mitral inflow consistent with reduced compliance of the\par   left ventricle. The IVC appears normal.\par   Trace mitral regurgitation is present.\par   The aortic valve is well visualized, appears normal.\par   Normal appearing left atrium.\par  \par   The left ventricle is normal in size. Mild concentric left ventricular\par   hypertrophy is present.\par   Estimated left ventricular ejection fraction is 45-50% calculated by\par   Perez's and visual estimate.\par   Apical cap and apical anterior wall are akinetic. Mid anteroseptal is\par   hypokinetic. Rest of segments wall motion is preserved.\par   Normal appearing right ventricle structure and function\par  \par   Signature [de-identified] : \par  Cath 10/8/2021:\par   Impression\par   Interventional Conclusions\par   Successful MERRILL placement to mid RCA resulting in SANDEEP 3. No EKG changes at\par   end of procedure with resolving chest pain.\par   Recommendations\par   Continue DAPT for 1 year. Recommend aggressive medical therapy as per\par   ACC/AHA guidelines. Findings relayed to patient and patient's family.\par   Patient to follow up closely in the office.\par  Estimated Blood Loss:4ml.\par  Complications:\par  No complication.\par  \par  10/7/2021 Cath:\par   Impression\par   Diagnostic Conclusions\par   Proximal LAD 99% lesion as culprit for STEMI, resulting in SANDEEP 2 flow.\par   Severe disease of dominant RCA present as well as non dominant ostial Cx.\par   Interventional Conclusions\par   Successful PCI to proximal LAD resulting in SANDEEP 3 flow. Postdilation used\par   to optimize inflow of stent.\par   Recommendations\par   Continue DAPT for 1 year. Recommend aggressive medical therapy for CAD as\par   per ACC/AHA guidelines. Plan for stage PCI to RCA within 48 hours.\par   Findings relayed to patient, patient's wife, and CCU team.\par

## 2023-12-13 NOTE — HISTORY OF PRESENT ILLNESS
[FreeTextEntry1] : Shiva Weinstein is a 61 year old man with recent STEMI of proximal LAD s/p revascularization 10/7/2021 with stage to RCA who presents today for follow up. He overall feels well and reports no further chest pain since his revascularization. EF was found to be 45% at that time, now improved to 60-65.. He was started on goal directed medical therapy. Today he feels well and denies any chest pain, sob, etc. He is now ambulating more and engaging in mild exertion. Recently took a trip to [place], [place] to help his brother move, and had no limiting issues. Multiple cruises over the weekend.  Started nicotine patch but he still smokes on it, states he may need a stronger patch.  doesn't wear anymore that much Admits to still smoking at the current time. denies resources to quit  A1c 5.4--> 5.6 --> 5.8  --> 96 (3/2022)--> 70 (2023) Aug 2023 - LDL 90- triglycerides- 94; HDL- 51; total cholesterol- 158 A1C 5.4 December 2023 HDL 40  LDL 17 Cholesterol 91

## 2023-12-13 NOTE — DISCUSSION/SUMMARY
[FreeTextEntry1] : 61 year old man with recent STEMI of proximal LAD s/p revascularization 10/7/2021 with stage to RCA who presents today for follow up.   CAD s/p STEMI with reduced EF No further chest pain. Euvolemic on exam today.  EF now normalized. Labs reviewed Cr. 1.31 , HDL 40, LDL 17.  Will repeat BMP next visit. Pt needs to increase hydration.  -c/w aspirin 81 mg -off ticagrelor 90 mg bid given more than 1 year out  -c/w losartan to 50 mg -c/w spironolactone to 25 mg daily -c/w metoprolol to 50 mg XL  -c/w atorvastatin 80 mg daily -c/w Repatha inj -Still smoking cigarettes approx 6 cigarettes per day- importance of smoking cessation discussed.   PAD sx - check B/L LE arterial duplex - JAE PVR Follow up in 3 months,  repeat BMP  [EKG obtained to assist in diagnosis and management of assessed problem(s)] : EKG obtained to assist in diagnosis and management of assessed problem(s)

## 2023-12-18 ENCOUNTER — OUTPATIENT (OUTPATIENT)
Dept: OUTPATIENT SERVICES | Facility: HOSPITAL | Age: 63
LOS: 1 days | End: 2023-12-18
Payer: COMMERCIAL

## 2023-12-18 ENCOUNTER — TRANSCRIPTION ENCOUNTER (OUTPATIENT)
Age: 63
End: 2023-12-18

## 2023-12-18 DIAGNOSIS — I73.9 PERIPHERAL VASCULAR DISEASE, UNSPECIFIED: ICD-10-CM

## 2023-12-18 DIAGNOSIS — I82.409 ACUTE EMBOLISM AND THROMBOSIS OF UNSPECIFIED DEEP VEINS OF UNSPECIFIED LOWER EXTREMITY: ICD-10-CM

## 2023-12-18 DIAGNOSIS — Z90.49 ACQUIRED ABSENCE OF OTHER SPECIFIED PARTS OF DIGESTIVE TRACT: Chronic | ICD-10-CM

## 2023-12-18 PROCEDURE — 93923 UPR/LXTR ART STDY 3+ LVLS: CPT | Mod: 26

## 2023-12-18 PROCEDURE — 93925 LOWER EXTREMITY STUDY: CPT

## 2023-12-18 PROCEDURE — 93925 LOWER EXTREMITY STUDY: CPT | Mod: 26

## 2023-12-18 PROCEDURE — 93923 UPR/LXTR ART STDY 3+ LVLS: CPT

## 2023-12-19 DIAGNOSIS — I73.9 PERIPHERAL VASCULAR DISEASE, UNSPECIFIED: ICD-10-CM

## 2023-12-19 DIAGNOSIS — I82.409 ACUTE EMBOLISM AND THROMBOSIS OF UNSPECIFIED DEEP VEINS OF UNSPECIFIED LOWER EXTREMITY: ICD-10-CM

## 2024-02-09 ENCOUNTER — RX RENEWAL (OUTPATIENT)
Age: 64
End: 2024-02-09

## 2024-02-09 RX ORDER — ATORVASTATIN CALCIUM 80 MG/1
80 TABLET, FILM COATED ORAL
Qty: 90 | Refills: 3 | Status: ACTIVE | COMMUNITY
Start: 2021-10-29 | End: 1900-01-01

## 2024-02-16 NOTE — ED ADULT TRIAGE NOTE - ARRIVAL FROM
CM Note:  Pacemaker to be interrogated today  PT/OT recommend pt's family stay with him x 1week with home health services; otherwise, snf is recommended.  UGO Melton   Home

## 2024-03-08 DIAGNOSIS — I21.3 ST ELEVATION (STEMI) MYOCARDIAL INFARCTION OF UNSPECIFIED SITE: ICD-10-CM

## 2024-03-08 DIAGNOSIS — I73.9 PERIPHERAL VASCULAR DISEASE, UNSPECIFIED: ICD-10-CM

## 2024-03-13 ENCOUNTER — APPOINTMENT (OUTPATIENT)
Dept: CARDIOLOGY | Facility: CLINIC | Age: 64
End: 2024-03-13
Payer: MEDICARE

## 2024-03-13 VITALS
HEART RATE: 78 BPM | BODY MASS INDEX: 26.05 KG/M2 | HEIGHT: 70 IN | WEIGHT: 182 LBS | DIASTOLIC BLOOD PRESSURE: 76 MMHG | OXYGEN SATURATION: 94 % | SYSTOLIC BLOOD PRESSURE: 122 MMHG

## 2024-03-13 PROCEDURE — 99215 OFFICE O/P EST HI 40 MIN: CPT

## 2024-03-13 PROCEDURE — 99407 BEHAV CHNG SMOKING > 10 MIN: CPT | Mod: 25

## 2024-03-13 PROCEDURE — 93000 ELECTROCARDIOGRAM COMPLETE: CPT

## 2024-03-13 NOTE — CARDIOLOGY SUMMARY
[de-identified] : TTE 1/2022:\par   Estimated left ventricular ejection fraction is 60-65 %.\par   The left ventricle is normal in size, wall thickness, wall motion and\par   contractility as seen in limited views.\par   Normal appearing left atrium.\par   Normal appearing right atrium.\par   Lipomatous atrial septum is not ruled out.\par   The aortic valve is trileaflet with thin pliable leaflets.\par   The mitral valve leaflets appear thin and normal.\par   Trace to Mild mitral regurgitation is present.\par   Normal appearing tricuspid valve structure.\par   Trace to Mild tricuspid valve regurgitation is present.\par   Pulmonic valve not well seen.\par   Trace pulmonic valvular regurgitation is present.\par  \par   TTE 10/8/021:\par  \par   Fibrocalcific changes noted to the mitral valve leaflets with preserved\par   leaflet excursion.\par   EA reversal of the mitral inflow consistent with reduced compliance of the\par   left ventricle. The IVC appears normal.\par   Trace mitral regurgitation is present.\par   The aortic valve is well visualized, appears normal.\par   Normal appearing left atrium.\par  \par   The left ventricle is normal in size. Mild concentric left ventricular\par   hypertrophy is present.\par   Estimated left ventricular ejection fraction is 45-50% calculated by\par   Perez's and visual estimate.\par   Apical cap and apical anterior wall are akinetic. Mid anteroseptal is\par   hypokinetic. Rest of segments wall motion is preserved.\par   Normal appearing right ventricle structure and function\par  \par   Signature [de-identified] : \par  Cath 10/8/2021:\par   Impression\par   Interventional Conclusions\par   Successful MERRILL placement to mid RCA resulting in SANDEEP 3. No EKG changes at\par   end of procedure with resolving chest pain.\par   Recommendations\par   Continue DAPT for 1 year. Recommend aggressive medical therapy as per\par   ACC/AHA guidelines. Findings relayed to patient and patient's family.\par   Patient to follow up closely in the office.\par  Estimated Blood Loss:4ml.\par  Complications:\par  No complication.\par  \par  10/7/2021 Cath:\par   Impression\par   Diagnostic Conclusions\par   Proximal LAD 99% lesion as culprit for STEMI, resulting in SANDEEP 2 flow.\par   Severe disease of dominant RCA present as well as non dominant ostial Cx.\par   Interventional Conclusions\par   Successful PCI to proximal LAD resulting in SANDEEP 3 flow. Postdilation used\par   to optimize inflow of stent.\par   Recommendations\par   Continue DAPT for 1 year. Recommend aggressive medical therapy for CAD as\par   per ACC/AHA guidelines. Plan for stage PCI to RCA within 48 hours.\par   Findings relayed to patient, patient's wife, and CCU team.\par

## 2024-03-13 NOTE — HISTORY OF PRESENT ILLNESS
[FreeTextEntry1] : Shiva Weinstein is a 61 year old man with recent STEMI of proximal LAD s/p revascularization 10/7/2021 with stage to RCA who presents today for follow up. He overall feels well and reports no further chest pain since his revascularization. EF was found to be 45% at that time, now improved to 60-65.. He was started on goal directed medical therapy. Today he feels well and denies any chest pain, sob, etc. He is now ambulating more and engaging in mild exertion. Recently took a trip to [place], [place] to help his brother move, and had no limiting issues. Multiple cruises over the weekend.  Started nicotine patch but he still smokes on it, states he may need a stronger patch.  doesn't wear anymore that much Admits to still smoking at the current time. denies resources to quit  Recent JAE, PVR with positive  No supervised exercise Reports also itchiness with Repatha, as well as nasopharyngitis   A1c 5.4--> 5.6 --> 5.8  --> 96 (3/2022)--> 70 (2023)--> 19 (3/2024) Aug 2023 - LDL 90- triglycerides- 94; HDL- 51; total cholesterol- 158--> 57 (3/2024) A1C 5.4--> 5.6 (3/2024) December 2023 HDL 40  LDL 17 Cholesterol 91

## 2024-03-13 NOTE — REASON FOR VISIT
[Coronary Artery Disease] : coronary artery disease [FreeTextEntry1] : follow up   c/o RLE heaviness with exertion he believes leg heaviness is related to losartan.  denies CP or SOB was in [facility] this summer on a cruise able to walk and tour

## 2024-03-13 NOTE — COUNSELING
[Cessation strategies including cessation program discussed] : Cessation strategies including cessation program discussed [Use of nicotine replacement therapies and other medications discussed] : Use of nicotine replacement therapies and other medications discussed [Yes] : Willing to quit smoking [FreeTextEntry3] : 10

## 2024-03-13 NOTE — DISCUSSION/SUMMARY
[EKG obtained to assist in diagnosis and management of assessed problem(s)] : EKG obtained to assist in diagnosis and management of assessed problem(s) [FreeTextEntry1] : 61 year old man with recent STEMI of proximal LAD s/p revascularization 10/7/2021 with stage to RCA who presents today for follow up.   CAD s/p STEMI with reduced EF No further chest pain. Euvolemic on exam today.  EF now normalized. Labs reviewed Cr. 1.31 , HDL 40, LDL 17.  Will repeat BMP next visit. Pt needs to increase hydration.  -c/w aspirin 81 mg -off ticagrelor 90 mg bid given more than 1 year out  -c/w losartan to 50 mg -c/w spironolactone to 25 mg daily -c/w metoprolol to 50 mg XL  -c/w atorvastatin 80 mg daily -c/w Repatha inj, can consider another agent given nasopharyngitis, itchiness  -Still smoking cigarettes approx 6 cigarettes per day- importance of smoking cessation discussed.   PAD sx PAD on scans with duplex and miguel angel.  -strong exercise program with 30 mintues every day  -cilastazol if not effective  -eventual angiogram if no resolution   Differential diagnosis and plan of care discussed with patient after the evaluation.  Counseling on diet, exercise, and medication compliance was done. Counseling on smoking and alcohol cessation was offered when appropriate. Pain assessed and judicious use of narcotics when appropriate was discussed. Importance of fall prevention discussed. Advanced care planning was discussed with patient and family.

## 2024-03-14 ENCOUNTER — RX RENEWAL (OUTPATIENT)
Age: 64
End: 2024-03-14

## 2024-03-14 RX ORDER — EVOLOCUMAB 140 MG/ML
140 INJECTION, SOLUTION SUBCUTANEOUS
Qty: 4 | Refills: 4 | Status: ACTIVE | COMMUNITY
Start: 2023-09-11 | End: 1900-01-01

## 2024-04-24 ENCOUNTER — APPOINTMENT (OUTPATIENT)
Dept: CARDIOLOGY | Facility: CLINIC | Age: 64
End: 2024-04-24

## 2024-05-14 LAB
ANION GAP SERPL CALC-SCNC: 14 MMOL/L
BUN SERPL-MCNC: 15 MG/DL
CALCIUM SERPL-MCNC: 9.6 MG/DL
CHLORIDE SERPL-SCNC: 105 MMOL/L
CHOLEST SERPL-MCNC: 78 MG/DL
CO2 SERPL-SCNC: 22 MMOL/L
CREAT SERPL-MCNC: 1.27 MG/DL
EGFR: 63 ML/MIN/1.73M2
ESTIMATED AVERAGE GLUCOSE: 114 MG/DL
GLUCOSE SERPL-MCNC: 98 MG/DL
HBA1C MFR BLD HPLC: 5.6 %
HCT VFR BLD CALC: 46.8 %
HDLC SERPL-MCNC: 45 MG/DL
HGB BLD-MCNC: 15.6 G/DL
LDLC SERPL CALC-MCNC: 19 MG/DL
MCHC RBC-ENTMCNC: 31.8 PG
MCHC RBC-ENTMCNC: 33.3 GM/DL
MCV RBC AUTO: 95.5 FL
NONHDLC SERPL-MCNC: 33 MG/DL
PLATELET # BLD AUTO: 247 K/UL
POTASSIUM SERPL-SCNC: 4.9 MMOL/L
RBC # BLD: 4.9 M/UL
RBC # FLD: 14 %
SODIUM SERPL-SCNC: 140 MMOL/L
TRIGL SERPL-MCNC: 57 MG/DL
WBC # FLD AUTO: 8.83 K/UL

## 2024-05-22 ENCOUNTER — RX RENEWAL (OUTPATIENT)
Age: 64
End: 2024-05-22

## 2024-05-22 RX ORDER — SPIRONOLACTONE 25 MG/1
25 TABLET ORAL
Qty: 90 | Refills: 2 | Status: ACTIVE | COMMUNITY
Start: 2021-10-20 | End: 1900-01-01

## 2024-05-22 RX ORDER — METOPROLOL SUCCINATE 50 MG/1
50 TABLET, EXTENDED RELEASE ORAL
Qty: 90 | Refills: 2 | Status: ACTIVE | COMMUNITY
Start: 2021-10-20 | End: 1900-01-01

## 2024-09-24 ENCOUNTER — RX RENEWAL (OUTPATIENT)
Age: 64
End: 2024-09-24

## 2025-02-03 ENCOUNTER — RX RENEWAL (OUTPATIENT)
Age: 65
End: 2025-02-03

## 2025-04-02 ENCOUNTER — APPOINTMENT (OUTPATIENT)
Dept: CARDIOLOGY | Facility: CLINIC | Age: 65
End: 2025-04-02
Payer: MEDICARE

## 2025-04-02 ENCOUNTER — NON-APPOINTMENT (OUTPATIENT)
Age: 65
End: 2025-04-02

## 2025-04-02 VITALS
WEIGHT: 192 LBS | HEART RATE: 64 BPM | HEIGHT: 70 IN | BODY MASS INDEX: 27.49 KG/M2 | OXYGEN SATURATION: 93 % | DIASTOLIC BLOOD PRESSURE: 78 MMHG | SYSTOLIC BLOOD PRESSURE: 118 MMHG

## 2025-04-02 PROCEDURE — 99215 OFFICE O/P EST HI 40 MIN: CPT

## 2025-04-02 PROCEDURE — G2211 COMPLEX E/M VISIT ADD ON: CPT

## 2025-04-02 PROCEDURE — 93000 ELECTROCARDIOGRAM COMPLETE: CPT

## 2025-04-30 ENCOUNTER — RX RENEWAL (OUTPATIENT)
Age: 65
End: 2025-04-30

## 2025-05-09 ENCOUNTER — RX RENEWAL (OUTPATIENT)
Age: 65
End: 2025-05-09

## 2025-06-04 ENCOUNTER — OUTPATIENT (OUTPATIENT)
Dept: OUTPATIENT SERVICES | Facility: HOSPITAL | Age: 65
LOS: 1 days | End: 2025-06-04
Payer: MEDICARE

## 2025-06-04 ENCOUNTER — RESULT REVIEW (OUTPATIENT)
Age: 65
End: 2025-06-04

## 2025-06-04 DIAGNOSIS — Z90.49 ACQUIRED ABSENCE OF OTHER SPECIFIED PARTS OF DIGESTIVE TRACT: Chronic | ICD-10-CM

## 2025-06-04 DIAGNOSIS — I21.3 ST ELEVATION (STEMI) MYOCARDIAL INFARCTION OF UNSPECIFIED SITE: ICD-10-CM

## 2025-06-04 PROCEDURE — 93017 CV STRESS TEST TRACING ONLY: CPT

## 2025-06-04 PROCEDURE — 93016 CV STRESS TEST SUPVJ ONLY: CPT

## 2025-06-04 PROCEDURE — 93018 CV STRESS TEST I&R ONLY: CPT

## 2025-06-04 RX ORDER — METOPROLOL SUCCINATE 50 MG/1
1 TABLET, EXTENDED RELEASE ORAL
Refills: 0 | DISCHARGE

## 2025-06-04 RX ORDER — LOSARTAN POTASSIUM 100 MG/1
1 TABLET, FILM COATED ORAL
Refills: 0 | DISCHARGE

## 2025-06-04 RX ORDER — EVOLOCUMAB 140 MG/ML
140 INJECTION, SOLUTION SUBCUTANEOUS
Refills: 0 | DISCHARGE

## 2025-06-05 DIAGNOSIS — I21.3 ST ELEVATION (STEMI) MYOCARDIAL INFARCTION OF UNSPECIFIED SITE: ICD-10-CM
